# Patient Record
Sex: FEMALE | Race: WHITE | Employment: FULL TIME | URBAN - METROPOLITAN AREA
[De-identification: names, ages, dates, MRNs, and addresses within clinical notes are randomized per-mention and may not be internally consistent; named-entity substitution may affect disease eponyms.]

---

## 2019-08-01 ENCOUNTER — APPOINTMENT (OUTPATIENT)
Dept: GENERAL RADIOLOGY | Age: 39
End: 2019-08-01
Payer: COMMERCIAL

## 2019-08-01 ENCOUNTER — HOSPITAL ENCOUNTER (EMERGENCY)
Age: 39
Discharge: HOME | End: 2019-08-01
Attending: EMERGENCY MEDICINE
Payer: COMMERCIAL

## 2019-08-01 VITALS
OXYGEN SATURATION: 99 % | DIASTOLIC BLOOD PRESSURE: 83 MMHG | BODY MASS INDEX: 22.15 KG/M2 | RESPIRATION RATE: 14 BRPM | WEIGHT: 125 LBS | HEART RATE: 77 BPM | HEIGHT: 63 IN | SYSTOLIC BLOOD PRESSURE: 128 MMHG | TEMPERATURE: 98.2 F

## 2019-08-01 DIAGNOSIS — S62.347A CLOSED NONDISPLACED FRACTURE OF BASE OF FIFTH METACARPAL BONE OF LEFT HAND, INITIAL ENCOUNTER: ICD-10-CM

## 2019-08-01 DIAGNOSIS — S62.355A CLOSED NONDISPLACED FRACTURE OF SHAFT OF FOURTH METACARPAL BONE OF LEFT HAND, INITIAL ENCOUNTER: Primary | ICD-10-CM

## 2019-08-01 PROCEDURE — 73110 X-RAY EXAM OF WRIST: CPT

## 2019-08-01 PROCEDURE — 99283 EMERGENCY DEPT VISIT LOW MDM: CPT

## 2019-08-01 PROCEDURE — 73130 X-RAY EXAM OF HAND: CPT

## 2019-08-01 PROCEDURE — 6370000000 HC RX 637 (ALT 250 FOR IP): Performed by: NURSE PRACTITIONER

## 2019-08-01 RX ORDER — IBUPROFEN 400 MG/1
400 TABLET ORAL ONCE
Status: COMPLETED | OUTPATIENT
Start: 2019-08-01 | End: 2019-08-01

## 2019-08-01 RX ORDER — HYDROCODONE BITARTRATE AND ACETAMINOPHEN 5; 325 MG/1; MG/1
1 TABLET ORAL EVERY 8 HOURS PRN
Qty: 9 TABLET | Refills: 0 | Status: SHIPPED | OUTPATIENT
Start: 2019-08-01 | End: 2019-08-04

## 2019-08-01 RX ORDER — LIDOCAINE HYDROCHLORIDE 10 MG/ML
5 INJECTION, SOLUTION INFILTRATION; PERINEURAL ONCE
Status: DISCONTINUED | OUTPATIENT
Start: 2019-08-01 | End: 2019-08-02 | Stop reason: HOSPADM

## 2019-08-01 RX ORDER — BUPIVACAINE HYDROCHLORIDE 2.5 MG/ML
30 INJECTION, SOLUTION EPIDURAL; INFILTRATION; INTRACAUDAL ONCE
Status: DISCONTINUED | OUTPATIENT
Start: 2019-08-01 | End: 2019-08-02 | Stop reason: HOSPADM

## 2019-08-01 RX ADMIN — IBUPROFEN 400 MG: 400 TABLET ORAL at 19:40

## 2019-08-01 ASSESSMENT — PAIN DESCRIPTION - PAIN TYPE: TYPE: ACUTE PAIN

## 2019-08-01 ASSESSMENT — PAIN SCALES - GENERAL
PAINLEVEL_OUTOF10: 6
PAINLEVEL_OUTOF10: 6

## 2019-08-01 NOTE — ED PROVIDER NOTES
ED Attending  CC: Linda       Department of Emergency Medicine   ED  Provider Note  Admit Date/RoomTime: 8/1/2019  7:04 PM  ED Room: 35/  Chief Complaint   Wrist Injury (left wrist after fall during softball game)    History of Present Illness   Source of history provided by:  patient. History/Exam Limitations: none. Ashwin Bronson is a 41 y.o. old female who has a past medical history of: History reviewed. No pertinent past medical history. presents to the emergency department by private vehicle, for Left hand and wrist pain which occured 1 hour(s) prior to arrival.  Cause of complaint: landed on softball glove-covered hand while playing softball. There has been a history of no prior problems with this area in the past. Since onset the symptoms have been moderate in degree. Her pain is aggraveated by movement (extension of the 4-5th digits, use and palpation and relieved by rest.  No sensation change or loss. No additional pain complaints. ROS    Pertinent positives and negatives are stated within HPI, all other systems reviewed and are negative. Past Surgical History:  has a past surgical history that includes [place] tooth extraction and turbinate resection (08/17/2017). Social History:  reports that she has never smoked. She has never used smokeless tobacco. She reports that she drinks about 2.0 standard drinks of alcohol per week. She reports that she does not use drugs. Family History: family history is not on file. Allergies: Patient has no known allergies. Physical Exam            ED Triage Vitals [08/01/19 1902]   BP Temp Temp Source Pulse Resp SpO2 Height Weight   128/83 98.2 °F (36.8 °C) Oral 77 14 99 % 5' 3\" (1.6 m) 125 lb (56.7 kg)     Oxygen Saturation Interpretation: Normal.    Constitutional:  Alert, development consistent with age. Neck:  Normal ROM. Supple. Non-tender. Wrist:  Left  radial and ulnar aspect. Tenderness:  none. Swelling: None.

## 2020-09-10 ENCOUNTER — HOSPITAL ENCOUNTER (OUTPATIENT)
Age: 40
Discharge: HOME | End: 2020-09-12
Payer: COMMERCIAL

## 2020-09-10 ENCOUNTER — OFFICE VISIT (OUTPATIENT)
Dept: PRIMARY CARE CLINIC | Age: 40
End: 2020-09-10
Payer: COMMERCIAL

## 2020-09-10 VITALS
BODY MASS INDEX: 23.25 KG/M2 | OXYGEN SATURATION: 98 % | TEMPERATURE: 98.4 F | HEART RATE: 74 BPM | SYSTOLIC BLOOD PRESSURE: 118 MMHG | RESPIRATION RATE: 16 BRPM | WEIGHT: 131.2 LBS | DIASTOLIC BLOOD PRESSURE: 64 MMHG | HEIGHT: 63 IN

## 2020-09-10 LAB
ALBUMIN SERPL-MCNC: 4.4 G/DL (ref 3.5–5.2)
ALP BLD-CCNC: 37 U/L (ref 35–104)
ALT SERPL-CCNC: 16 U/L (ref 0–32)
ANION GAP SERPL CALCULATED.3IONS-SCNC: 16 MMOL/L (ref 7–16)
AST SERPL-CCNC: 19 U/L (ref 0–31)
BASOPHILS ABSOLUTE: 0.06 E9/L (ref 0–0.2)
BASOPHILS RELATIVE PERCENT: 0.7 % (ref 0–2)
BILIRUB SERPL-MCNC: 0.4 MG/DL (ref 0–1.2)
BUN BLDV-MCNC: 19 MG/DL (ref 6–20)
CALCIUM SERPL-MCNC: 9.9 MG/DL (ref 8.6–10.2)
CHLORIDE BLD-SCNC: 95 MMOL/L (ref 98–107)
CO2: 26 MMOL/L (ref 22–29)
CREAT SERPL-MCNC: 1 MG/DL (ref 0.5–1)
EOSINOPHILS ABSOLUTE: 0.04 E9/L (ref 0.05–0.5)
EOSINOPHILS RELATIVE PERCENT: 0.5 % (ref 0–6)
FOLATE: >20 NG/ML (ref 4.8–24.2)
GFR AFRICAN AMERICAN: >60
GFR NON-AFRICAN AMERICAN: >60 ML/MIN/1.73
GLUCOSE BLD-MCNC: 93 MG/DL (ref 74–99)
HBA1C MFR BLD: 4.9 % (ref 4–5.6)
HCG QUALITATIVE: NEGATIVE
HCT VFR BLD CALC: 42.2 % (ref 34–48)
HEMOGLOBIN: 13.6 G/DL (ref 11.5–15.5)
IMMATURE GRANULOCYTES #: 0.03 E9/L
IMMATURE GRANULOCYTES %: 0.4 % (ref 0–5)
LYMPHOCYTES ABSOLUTE: 2.52 E9/L (ref 1.5–4)
LYMPHOCYTES RELATIVE PERCENT: 30.4 % (ref 20–42)
MCH RBC QN AUTO: 30.5 PG (ref 26–35)
MCHC RBC AUTO-ENTMCNC: 32.2 % (ref 32–34.5)
MCV RBC AUTO: 94.6 FL (ref 80–99.9)
MONOCYTES ABSOLUTE: 0.62 E9/L (ref 0.1–0.95)
MONOCYTES RELATIVE PERCENT: 7.5 % (ref 2–12)
NEUTROPHILS ABSOLUTE: 5.03 E9/L (ref 1.8–7.3)
NEUTROPHILS RELATIVE PERCENT: 60.5 % (ref 43–80)
PDW BLD-RTO: 13 FL (ref 11.5–15)
PLATELET # BLD: 329 E9/L (ref 130–450)
PMV BLD AUTO: 11.4 FL (ref 7–12)
POTASSIUM SERPL-SCNC: 3.8 MMOL/L (ref 3.5–5)
RBC # BLD: 4.46 E12/L (ref 3.5–5.5)
RHEUMATOID FACTOR: 10 IU/ML (ref 0–13)
SEDIMENTATION RATE, ERYTHROCYTE: 2 MM/HR (ref 0–20)
SODIUM BLD-SCNC: 137 MMOL/L (ref 132–146)
TOTAL PROTEIN: 7.2 G/DL (ref 6.4–8.3)
TSH SERPL DL<=0.05 MIU/L-ACNC: 1.65 UIU/ML (ref 0.27–4.2)
VITAMIN B-12: 412 PG/ML (ref 211–946)
VITAMIN D 25-HYDROXY: 115 NG/ML (ref 30–100)
WBC # BLD: 8.3 E9/L (ref 4.5–11.5)

## 2020-09-10 PROCEDURE — 84703 CHORIONIC GONADOTROPIN ASSAY: CPT

## 2020-09-10 PROCEDURE — 85025 COMPLETE CBC W/AUTO DIFF WBC: CPT

## 2020-09-10 PROCEDURE — 99214 OFFICE O/P EST MOD 30 MIN: CPT | Performed by: FAMILY MEDICINE

## 2020-09-10 PROCEDURE — 83036 HEMOGLOBIN GLYCOSYLATED A1C: CPT

## 2020-09-10 PROCEDURE — 82746 ASSAY OF FOLIC ACID SERUM: CPT

## 2020-09-10 PROCEDURE — 82607 VITAMIN B-12: CPT

## 2020-09-10 PROCEDURE — 86038 ANTINUCLEAR ANTIBODIES: CPT

## 2020-09-10 PROCEDURE — 86431 RHEUMATOID FACTOR QUANT: CPT

## 2020-09-10 PROCEDURE — 85651 RBC SED RATE NONAUTOMATED: CPT

## 2020-09-10 PROCEDURE — 84443 ASSAY THYROID STIM HORMONE: CPT

## 2020-09-10 PROCEDURE — 82306 VITAMIN D 25 HYDROXY: CPT

## 2020-09-10 PROCEDURE — 80053 COMPREHEN METABOLIC PANEL: CPT

## 2020-09-10 PROCEDURE — 36415 COLL VENOUS BLD VENIPUNCTURE: CPT

## 2020-09-10 RX ORDER — METHYLPREDNISOLONE 4 MG/1
TABLET ORAL
COMMUNITY
Start: 2020-09-04 | End: 2020-11-16 | Stop reason: ALTCHOICE

## 2020-09-10 ASSESSMENT — ENCOUNTER SYMPTOMS
VOMITING: 0
SHORTNESS OF BREATH: 0
ALLERGIC/IMMUNOLOGIC NEGATIVE: 1
BLOOD IN STOOL: 0
ABDOMINAL PAIN: 0
PHOTOPHOBIA: 0
SORE THROAT: 0
CHEST TIGHTNESS: 0
DIARRHEA: 0
WHEEZING: 0
APNEA: 0
FACIAL SWELLING: 0
BACK PAIN: 0
NAUSEA: 0
COLOR CHANGE: 0
COUGH: 0
SINUS PRESSURE: 0

## 2020-09-10 ASSESSMENT — PATIENT HEALTH QUESTIONNAIRE - PHQ9
SUM OF ALL RESPONSES TO PHQ QUESTIONS 1-9: 0
SUM OF ALL RESPONSES TO PHQ9 QUESTIONS 1 & 2: 0
1. LITTLE INTEREST OR PLEASURE IN DOING THINGS: 0
2. FEELING DOWN, DEPRESSED OR HOPELESS: 0
SUM OF ALL RESPONSES TO PHQ QUESTIONS 1-9: 0

## 2020-09-10 NOTE — PROGRESS NOTES
Chief Complaint:   Chief Complaint   Patient presents with   [facility]       Neurologic Problem   The patient's pertinent negatives include no syncope or weakness. Primary symptoms comment: numbness. This is a chronic problem. There was right-sided focality noted. Pertinent negatives include no abdominal pain, back pain, chest pain, confusion, headaches, light-headedness, nausea, palpitations, shortness of breath or vomiting.   doing well no new issues    There is no problem list on file for this patient. History reviewed. No pertinent past medical history. Past Surgical History:   Procedure Laterality Date    TURBINATE RESECTION  08/17/2017    WISDOM TOOTH EXTRACTION         Current Outpatient Medications   Medication Sig Dispense Refill    methylPREDNISolone (MEDROL DOSEPACK) 4 MG tablet       norethindrone-ethinyl estradiol (BALZIVA) 0.4-35 MG-MCG per tablet Take by mouth See Admin Instructions      Multiple Vitamins-Minerals (THERAPEUTIC MULTIVITAMIN-MINERALS) tablet Take 1 tablet by mouth daily LD 8/14/17       No current facility-administered medications for this visit. No Known Allergies    Social History     Socioeconomic History    Marital status: Single     Spouse name: None    Number of children: None    Years of education: None    Highest education level: None   Occupational History    None   Social Needs    Financial resource strain: None    Food insecurity     Worry: None     Inability: None    Transportation needs     Medical: None     Non-medical: None   Tobacco Use    Smoking status: Never Smoker    Smokeless tobacco: Never Used   Substance and Sexual Activity    Alcohol use:  Yes     Alcohol/week: 2.0 standard drinks     Types: 2 Cans of beer per week     Comment: 2-3 beers 2-3 days a week     Drug use: Never    Sexual activity: None   Lifestyle    Physical activity     Days per week: None     Minutes per session: None    Stress: None   Relationships    Social connections     Talks on phone: None     Gets together: None     Attends Judaism service: None     Active member of club or organization: None     Attends meetings of clubs or organizations: None     Relationship status: None    Intimate partner violence     Fear of current or ex partner: None     Emotionally abused: None     Physically abused: None     Forced sexual activity: None   Other Topics Concern    None   Social History Narrative    None       History reviewed. No pertinent family history. Review of Systems   Constitutional: Negative. HENT: Negative for congestion, facial swelling, hearing loss, nosebleeds, sinus pressure and sore throat. Eyes: Negative for photophobia and visual disturbance. Respiratory: Negative for apnea, cough, chest tightness, shortness of breath and wheezing. Cardiovascular: Negative for chest pain, palpitations and leg swelling. Gastrointestinal: Negative for abdominal pain, blood in stool, diarrhea, nausea and vomiting. Genitourinary: Negative for difficulty urinating, frequency and urgency. Musculoskeletal: Positive for arthralgias. Negative for back pain, joint swelling and myalgias. Skin: Negative for color change and rash. Allergic/Immunologic: Negative. Neurological: Positive for numbness. Negative for syncope, weakness, light-headedness and headaches. Hematological: Negative. Psychiatric/Behavioral: Negative for agitation, behavioral problems, confusion and self-injury. The patient is not nervous/anxious. All other systems reviewed and are negative. Physical Exam  Vitals signs and nursing note reviewed. Constitutional:       General: She is not in acute distress. Appearance: She is well-developed. HENT:      Head: Normocephalic and atraumatic. Nose: Nose normal.   Eyes:      Conjunctiva/sclera: Conjunctivae normal.      Pupils: Pupils are equal, round, and reactive to light.    Neck:      Musculoskeletal: Normal range of motion and neck supple. Thyroid: No thyromegaly. Vascular: No JVD. Cardiovascular:      Rate and Rhythm: Normal rate and regular rhythm. Heart sounds: Normal heart sounds. No murmur. No friction rub. No gallop. Pulmonary:      Effort: Pulmonary effort is normal. No respiratory distress. Breath sounds: Normal breath sounds. No wheezing. Abdominal:      General: Bowel sounds are normal. There is no distension. Palpations: Abdomen is soft. Tenderness: There is no abdominal tenderness. There is no guarding or rebound. Musculoskeletal: Normal range of motion. Lymphadenopathy:      Cervical: No cervical adenopathy. Skin:     General: Skin is warm and dry. Findings: No erythema or rash. Neurological:      Mental Status: She is alert and oriented to person, place, and time. Cranial Nerves: No cranial nerve deficit. Motor: No abnormal muscle tone. Coordination: Coordination normal.      Deep Tendon Reflexes: Reflexes are normal and symmetric. Psychiatric:         Behavior: Behavior normal.         Judgment: Judgment normal.                               ASSESSMENT/PLAN:    Jai Sanford was seen today for establish care. Diagnoses and all orders for this visit:    Arthralgia, unspecified joint  -     CBC Auto Differential; Future  -     Comprehensive Metabolic Panel; Future  -     TSH without Reflex; Future  -     Hemoglobin A1C; Future  -     VITAMIN B12 & FOLATE; Future  -     Vitamin D 25 Hydroxy; Future  -     SEDIMENTATION RATE; Future  -     RHEUMATOID FACTOR; Future  -     TOOTIE; Future  -     HCG Qualitative, Serum; Future    Neuropathy  -     CBC Auto Differential; Future  -     Comprehensive Metabolic Panel; Future  -     TSH without Reflex; Future  -     Hemoglobin A1C; Future  -     VITAMIN B12 & FOLATE; Future  -     Vitamin D 25 Hydroxy; Future  -     SEDIMENTATION RATE; Future  -     RHEUMATOID FACTOR; Future  -     TOOTIE;  Future  -     HCG Qualitative, Serum;  Future  -     Dina Zaidi MD, Neurology, [place], [place]    9/10/2020  11:29 AM

## 2020-09-11 ENCOUNTER — TELEPHONE (OUTPATIENT)
Dept: PRIMARY CARE CLINIC | Age: 40
End: 2020-09-11

## 2020-09-11 LAB — ANTI-NUCLEAR ANTIBODY (ANA): NEGATIVE

## 2020-09-11 NOTE — TELEPHONE ENCOUNTER
Pt says the only kind of vitamin she takes is a multi vitamin, she does not take a vitamin d supplement

## 2020-09-14 ENCOUNTER — NURSE ONLY (OUTPATIENT)
Dept: PRIMARY CARE CLINIC | Age: 40
End: 2020-09-14
Payer: COMMERCIAL

## 2020-09-14 PROCEDURE — 96372 THER/PROPH/DIAG INJ SC/IM: CPT | Performed by: FAMILY MEDICINE

## 2020-09-14 RX ORDER — CYANOCOBALAMIN 1000 UG/ML
1000 INJECTION INTRAMUSCULAR; SUBCUTANEOUS ONCE
Status: COMPLETED | OUTPATIENT
Start: 2020-09-14 | End: 2020-09-14

## 2020-09-14 RX ADMIN — CYANOCOBALAMIN 1000 MCG: 1000 INJECTION INTRAMUSCULAR; SUBCUTANEOUS at 09:53

## 2020-10-06 ENCOUNTER — TELEPHONE (OUTPATIENT)
Dept: PRIMARY CARE CLINIC | Age: 40
End: 2020-10-06

## 2020-10-06 ENCOUNTER — OFFICE VISIT (OUTPATIENT)
Dept: NEUROLOGY | Age: 40
End: 2020-10-06
Payer: COMMERCIAL

## 2020-10-06 ENCOUNTER — HOSPITAL ENCOUNTER (OUTPATIENT)
Age: 40
Discharge: HOME | End: 2020-10-08
Payer: COMMERCIAL

## 2020-10-06 VITALS
WEIGHT: 131 LBS | TEMPERATURE: 98 F | BODY MASS INDEX: 23.21 KG/M2 | DIASTOLIC BLOOD PRESSURE: 80 MMHG | HEIGHT: 63 IN | SYSTOLIC BLOOD PRESSURE: 120 MMHG

## 2020-10-06 PROBLEM — G62.9 NEUROPATHY: Status: ACTIVE | Noted: 2020-10-06

## 2020-10-06 PROBLEM — Z86.39 HX OF NON ANEMIC VITAMIN B12 DEFICIENCY: Status: ACTIVE | Noted: 2020-10-06

## 2020-10-06 LAB
MAGNESIUM: 2.1 MG/DL (ref 1.6–2.6)
TOTAL CK: 109 U/L (ref 20–180)

## 2020-10-06 PROCEDURE — 82164 ANGIOTENSIN I ENZYME TEST: CPT

## 2020-10-06 PROCEDURE — 84207 ASSAY OF VITAMIN B-6: CPT

## 2020-10-06 PROCEDURE — 86235 NUCLEAR ANTIGEN ANTIBODY: CPT

## 2020-10-06 PROCEDURE — 82525 ASSAY OF COPPER: CPT

## 2020-10-06 PROCEDURE — 86592 SYPHILIS TEST NON-TREP QUAL: CPT

## 2020-10-06 PROCEDURE — 84630 ASSAY OF ZINC: CPT

## 2020-10-06 PROCEDURE — 82085 ASSAY OF ALDOLASE: CPT

## 2020-10-06 PROCEDURE — 84446 ASSAY OF VITAMIN E: CPT

## 2020-10-06 PROCEDURE — 82550 ASSAY OF CK (CPK): CPT

## 2020-10-06 PROCEDURE — 86334 IMMUNOFIX E-PHORESIS SERUM: CPT

## 2020-10-06 PROCEDURE — 86803 HEPATITIS C AB TEST: CPT

## 2020-10-06 PROCEDURE — 86255 FLUORESCENT ANTIBODY SCREEN: CPT

## 2020-10-06 PROCEDURE — 86618 LYME DISEASE ANTIBODY: CPT

## 2020-10-06 PROCEDURE — 99204 OFFICE O/P NEW MOD 45 MIN: CPT | Performed by: PSYCHIATRY & NEUROLOGY

## 2020-10-06 PROCEDURE — 83735 ASSAY OF MAGNESIUM: CPT

## 2020-10-06 PROCEDURE — 84425 ASSAY OF VITAMIN B-1: CPT

## 2020-10-06 PROCEDURE — 84165 PROTEIN E-PHORESIS SERUM: CPT

## 2020-10-06 PROCEDURE — 36415 COLL VENOUS BLD VENIPUNCTURE: CPT

## 2020-10-06 ASSESSMENT — ENCOUNTER SYMPTOMS
EYES NEGATIVE: 1
GASTROINTESTINAL NEGATIVE: 1
RESPIRATORY NEGATIVE: 1
ALLERGIC/IMMUNOLOGIC NEGATIVE: 1

## 2020-10-06 NOTE — PROGRESS NOTES
and folic acid levels, normal CBC, vitamin D level, TSH, blood glucose 93, hemoglobin A1c 4.9, normal LFTs, normal CMP. Imaging Data: X-ray left hand, 8/1/2019. Improved alignment compared to prior at site of fourth and fifth metacarpal bone fracture. Current Outpatient Medications   Medication Sig Dispense Refill    norethindrone-ethinyl estradiol (BALZIVA) 0.4-35 MG-MCG per tablet Take by mouth See Admin Instructions      Multiple Vitamins-Minerals (THERAPEUTIC MULTIVITAMIN-MINERALS) tablet Take 1 tablet by mouth daily LD 8/14/17      methylPREDNISolone (MEDROL DOSEPACK) 4 MG tablet        No current facility-administered medications for this visit. No Known Allergies    Patient Active Problem List   Diagnosis    Neuropathy    Hx of non anemic vitamin B12 deficiency       Past Medical History:   Diagnosis Date    Hx of non anemic vitamin B12 deficiency 10/6/2020    Neuropathy 10/6/2020       Past Surgical History:   Procedure Laterality Date    TURBINATE RESECTION  08/17/2017    WISDOM TOOTH EXTRACTION         No family history on file. No history of hereditary neuropathy. Social History     Socioeconomic History    Marital status: Single     Spouse name: Not on file    Number of children: Not on file    Years of education: Not on file    Highest education level: Not on file   Occupational History    Not on file   Social Needs    Financial resource strain: Not on file    Food insecurity     Worry: Not on file     Inability: Not on file    Transportation needs     Medical: Not on file     Non-medical: Not on file   Tobacco Use    Smoking status: Never Smoker    Smokeless tobacco: Never Used   Substance and Sexual Activity    Alcohol use:  Yes     Alcohol/week: 2.0 standard drinks     Types: 2 Cans of beer per week     Comment: 2-3 beers 2-3 days a week     Drug use: Never    Sexual activity: Not on file   Lifestyle    Physical activity     Days per week: Not on file Minutes per session: Not on file    Stress: Not on file   Relationships    Social connections     Talks on phone: Not on file     Gets together: Not on file     Attends Druze service: Not on file     Active member of club or organization: Not on file     Attends meetings of clubs or organizations: Not on file     Relationship status: Not on file    Intimate partner violence     Fear of current or ex partner: Not on file     Emotionally abused: Not on file     Physically abused: Not on file     Forced sexual activity: Not on file   Other Topics Concern    Not on file   Social History Narrative    Not on file     Review of Systems   Constitutional: Negative. HENT: Negative. Eyes: Negative. Respiratory: Negative. Cardiovascular: Negative. Gastrointestinal: Negative. Endocrine: Negative. Genitourinary: Negative. Musculoskeletal: Positive for arthralgias. Skin: Negative. Allergic/Immunologic: Negative. Neurological: Positive for numbness. Paresthesias   Hematological: Negative. Psychiatric/Behavioral: The patient is nervous/anxious. Neurologic Exam:  /80 (Site: Right Upper Arm, Position: Sitting, Cuff Size: Medium Adult)   Temp 98 °F (36.7 °C)   Ht 5' 3\" (1.6 m)   Wt 131 lb (59.4 kg)   BMI 23.21 kg/m²   General appearance: Alert, cooperative, anxious, with nourished, groomed, seated on the exam table, no acute distress. HEENT: Normocephalic/atraumatic. Neck: Supple  Cardiac: RRR  Respiratory: grossly clear  Extremities: No edema, erythema or cyanosis  Skin: No apparent lesions or rashes  Musculoskeletal: Negative bilateral straight leg raising test, no fasciculations, tremors or foot drop, no truncal sensory level. Mental Status: Alert, oriented x4  Speech/Language: Clear, grossly fluent  Attention span/Concentration: Grossly intact.   Affect/Mood: Anxious  Insight/Judgement: Appears fairly good     Fund of Knowledge/Current events: Grossly intact  CN II-XII:     Pupils: Equal, reactive to light, 3.0 mm     EOM's: Full without nystagmus  Visual Fields: Full to confrontation  Fundi: Miosis to light, unable to well visualize  CN V: normal V1-V3  CN VII: No facial droop  CN VIII: Hearing grossly intact  CN IX-XII: Tongue midline  SCM/Trapezii: 5/5 power  Motor: 5/5 power in the upper and lower extremities without tremor or drift and normal motor tone without cogwheeling or spasticity, intact fine motor function of both hands, symmetric. DTR's: 1+ and symmetric in the upper and lower extremities with reinforcement, no ankle clonus, plantar responses are flexor. Negative Tien's and finger flexion reflex responses. Negative Tinel's test to percussion over both wrists. Sensory: Reports grossly intact subjective sensation to light touch and sharp stick testing throughout. Denies a stocking/glove distribution of sensory loss pattern. No right/left confusion. No truncal sensory level. Coordination/Gait: Normal finger-to-nose and heel-to-shin testing, no truncal or cerebellar gait ataxia. Assessment/Plan:  1. History of paresthesias and neuropathic pain primarily affecting the right extremities reported as worsening over past several years, however, initial onset about 10 years ago when she was first diagnosed with a vitamin B12 deficiency while residing in [place], treated with monthly vitamin B12 injections with clinical improvement of her symptoms. 2.  Normal, nondiagnostic NCS/EMG study sampling the right extremities for peripheral neuropathy evaluation, April, 2019, [facility]. 3.  A small fiber neuropathy cannot be diagnosed by means of electrodiagnostic testing. 4.  Additional lab tests ordered to complete a neuropathy lab screening panel are listed below and once resulted she will be informed of the results by phone. 5.   An NCS/EMG study again sampling the right extremities for peripheral neuropathy evaluation has been ordered and will be completed within the next several weeks. She will receive the test results at the time of her study. 6.  She was provided patient information from the Neuropathy Association website. We discussed that up to 30% of neuropathy cases the exact etiology cannot be determined, thus idiopathic. Sincerely,      Felecia Lamas MD    This note was created using speech recognition transcription software. Despite proofreading, there may be several typographical errors present that may affect the meaning of the content. Please call with any questions. Note: More than 50% of this 40-minute face-face visit time included counseling and coordination of care based on clinical impression, review of test results, treatment plan, risk factor reduction and patient and/or family education.     Orders Placed This Encounter   Procedures    Vitamin B6     Standing Status:   Future     Standing Expiration Date:   10/6/2021    Vitamin B1     Standing Status:   Future     Standing Expiration Date:   10/6/2021    Sjogren's Ab (SS-A, SS-B)     Standing Status:   Future     Standing Expiration Date:   10/6/2021    RPR Reflex to Titer and TPPA     Standing Status:   Future     Standing Expiration Date:   10/6/2021    CK     Standing Status:   Future     Standing Expiration Date:   10/6/2021    Aldolase     Standing Status:   Future     Standing Expiration Date:   10/6/2021    Magnesium     Standing Status:   Future     Standing Expiration Date:   10/6/2021    Lyme Disease Acute Reflexive Panel     Standing Status:   Future     Standing Expiration Date:   10/6/2021    Immunofixation electrophoresis     Standing Status:   Future     Standing Expiration Date:   10/6/2021    Copper, Serum     Standing Status:   Future     Standing Expiration Date:   10/6/2021    ZINC     Standing Status:   Future     Standing Expiration Date:   10/6/2021    HEPATITIS C ANTIBODY     Standing Status:   Future     Standing Expiration Date:   10/6/2021    Angiotensin Converting Enzyme     Standing Status:   Future     Standing Expiration Date:   10/6/2021    Anti-Neutrophilic Cytoplasmic Antibody     Standing Status:   Future     Standing Expiration Date:   10/6/2021    VITAMIN E     Standing Status:   Future     Standing Expiration Date:   10/6/2021    EMG     Paresthesias, pain of rt. Limbs>left x several yrs. Order Specific Question:   Which body part?      Answer:   neuropathy eval., RUE/RLE

## 2020-10-07 LAB
ALBUMIN SERPL-MCNC: 3.6 G/DL (ref 3.5–4.7)
ALPHA-1-GLOBULIN: 0.3 G/DL (ref 0.2–0.4)
ALPHA-2-GLOBULIN: 0.7 G/FL (ref 0.5–1)
BETA GLOBULIN: 1 G/DL (ref 0.8–1.3)
ELECTROPHORESIS: NORMAL
GAMMA GLOBULIN: 1.1 G/DL (ref 0.7–1.6)
HEPATITIS C ANTIBODY INTERPRETATION: NORMAL
IMMUNOFIXATION RESULT, SERUM: NORMAL
RPR: NORMAL
TOTAL PROTEIN: 6.7 G/DL (ref 6.4–8.3)

## 2020-10-09 ENCOUNTER — OFFICE VISIT (OUTPATIENT)
Dept: NEUROLOGY | Age: 40
End: 2020-10-09
Payer: COMMERCIAL

## 2020-10-09 VITALS
BODY MASS INDEX: 23.04 KG/M2 | TEMPERATURE: 97.9 F | HEIGHT: 63 IN | SYSTOLIC BLOOD PRESSURE: 100 MMHG | DIASTOLIC BLOOD PRESSURE: 80 MMHG | WEIGHT: 130 LBS

## 2020-10-09 PROBLEM — M54.17 RIGHT LUMBOSACRAL RADICULOPATHY: Chronic | Status: ACTIVE | Noted: 2020-10-09

## 2020-10-09 LAB
ALDOLASE: 2.6 U/L (ref 1.5–8.1)
ANGIOTENSIN CONVERTING ENZYME: 8 U/L (ref 9–67)
ENA TO SSA (RO) ANTIBODY: NEGATIVE
ENA TO SSB (LA) ANTIBODY: NEGATIVE

## 2020-10-09 PROCEDURE — 99212 OFFICE O/P EST SF 10 MIN: CPT | Performed by: PSYCHIATRY & NEUROLOGY

## 2020-10-09 PROCEDURE — 95886 MUSC TEST DONE W/N TEST COMP: CPT | Performed by: PSYCHIATRY & NEUROLOGY

## 2020-10-09 PROCEDURE — 95912 NRV CNDJ TEST 11-12 STUDIES: CPT | Performed by: PSYCHIATRY & NEUROLOGY

## 2020-10-09 NOTE — PROGRESS NOTES
[id number] [facility]  Electrodiagnostic Laboratory  *Accredited by the [facility] with exemplary status  [address] [facility]  Phone: [phone]  Fax: [phone]    Referring Provider: Kim Arrington MD  Primary Care Physician: Gisselle Lee DO  Patient Name: Joanne Mitchell  Patient YOB: 1980  Gender: female  BMI: Body mass index is 23.03 kg/m². Blood pressure 100/80, temperature 97.9 °F (36.6 °C), height 5' 3\" (1.6 m), weight 130 lb (59 kg). 10/9/2020    Description of clinical problem: Evaluation for peripheral neuropathy, reports sensation of numbness tingling and aching pain involving the right upper and lower extremities primarily and somewhat in the left upper extremity times several years. Past diagnosis of vitamin B12 deficiency starting about 10 years ago. Chief Complaint   Patient presents with    Procedure     EMG     Pain Yes   ; Numbness/tingling  Yes; Weakness  No       Brief physical exam:   Sensory deficit No; Weakness No; Atrophy  No; Reflex abnormality No  (Please refer to prior recent Neurology consult note of 10/6/2020 for additional information if needed.)  Focused neurologic exam shows grossly intact 5/5 power in the upper and lower extremities without tremor or drift and normal motor tone. DTRs: 1+ and symmetric, no ankle clonus. Negative Tien's and finger flexion reflex responses, negative Tinel's test to percussion over both wrists. No focal muscular atrophy or fasciculations. Sensory exam shows grossly intact subjective sensation to light touch and sharp stick testing throughout, no stocking/glove distribution of sensory loss pattern. Musculoskeletal: Negative bilateral straight leg raising test.    Study Limitations:  None.       Full Name: Laurie Martinez Gender: Female  MRN: [id number] YOB: 1980      Visit Date: 10/9/2020 08:39  Age: 47 Years 1 Months Old  Examining Physician: Dr. Nba Clifton   Referring Physician: Dr. Nba Clifton Technician: Emily Villasenor   Height: 5 feet 3 inch  Weight: 130 lbs  Notes: Neuropathy \ Paresthesia       Motor NCS      Nerve / Sites Latency Amplitude Amp. 1-2 Distance Lat Diff Velocity Temp.    ms mV % cm ms m/s °C   R Median - APB      Wrist 3.44 10.7 100 8   32      Elbow 6.88 10.5 98.4 20 3.44 58 32   R Ulnar - ADM      Wrist 2.81 10.5 100 8   32.5      B. Elbow 5.89 10.9 104 19 3.07 62 32.5      A. Elbow 7.60 9.0 85.8 10 1.72 58 32.5   R Peroneal - EDB      Ankle 4.11 13.3 100 8   32.6      Pop fossa 11.82 12.4 93.7 35 7.71 45 32.6   R Tibial - AH      Ankle 4.17 10.6 100 8   32.7      Pop fossa 12.03 9.4 88.1 37 7.86 47 32.7               Sensory NCS      Nerve / Sites Onset Lat Peak Lat PP Amp Amp. 1-2 Distance Velocity Temp.    ms ms µV % cm m/s °C   R Median - Digit II (Antidromic)      Mid Palm 1.25 2.14 113.4 100 7 56 32.6      Wrist 2.71 3.59 95.3 81.8 14 52 32.6   R Ulnar - Digit V (Antidromic)      Wrist 2.81 3.70 71.8 100 14 50 32.5   R Radial - Anatomical  (Forearm)      Forearm 1.72 2.34 47.0 100 10 58 32.5   R Sural - Ankle (Calf)      Calf 3.07 3.96 7.9 100 14 46 32.2   R Superficial peroneal - Ankle      Lat leg 2.40 2.97 5.1 100 10 42 32.7                 Combined Sensory Index      Nerve / Sites Rec. Site Peak Lat NP Amp PP Amp Segments Peak Diff Temp.      ms µV µV  ms °C   R Median - CSI      Median Thumb 2.92 23.0 40.3 Median - Radial 0.16 32.5      Radial Thumb 2.76 13.1 18.1 Median - Ulnar -0.36 32.5      Median Ring 3.54 42.8 65.6 Median palm - Ulnar palm -0.05 32.3      Ulnar Ring 3.91 36.4 57.4         Median palm Wrist 1.88 196.5 215.6         Ulnar palm Wrist 1.93 20.9 46.1         CSI     CSI 0.26          F  Wave      Nerve F Lat M Lat F-M Lat    ms ms ms   R Peroneal-EDB 49.2 4.3 44.9   R Tibial- AH 50.5 3.7 46.8   R Median-APB 25.7 3.4 22.2   R Ulnar  ADM 25.0 2.2 22.8       H Reflex      Nerve Lat Hmax    ms   R Tibial - [place]us 27.6   L Tibial - Soleus 28.6       EMG         EMG Summary Table     Spontaneous MUAP Recruitment   Muscle IA Fib PSW Fasc H.F. Amp Dur. PPP Pattern   R. Tibialis anterior Normal None None None None Normal Normal None Normal   R. Gastroc (Medial head) Normal None None None None 1+ 1+ None Decr   R. Flexor digitorum longus Normal None None None None 2+ 2+ None Decr   R. Extensor digitorum brevis Normal None None None None Giant 2+ None Decr   R. Abductor hallucis Normal None None None None 1+ 1+ None Decr   R. Vastus lateralis Normal None None None None 1+ 1+ None Decr   R. Gluteus medius Normal None None None None Normal Normal None Normal   R. Upper Sacral paraspinals Incr 1+ None None None -- -- -- --   R. Lumbar paraspinals (low) Normal None None None None -- -- -- --   R. First dorsal interosseous Normal None None None None Normal Normal None Normal   R. Abductor pollicis brevis Normal None None None None Normal Normal None Normal   R. Flexor pollicis longus Normal None None None None Normal Normal None Normal   R. Ext digit comm Normal None None None None Normal Normal None Normal   R. Pronator teres Normal None None None None Normal Normal None Normal   R. Biceps brachii Normal None None None None Normal Normal None Normal   R. Triceps brachii Normal None None None None Normal Normal None Normal   R. Deltoid Normal None None None None Normal Normal None Normal       Summary of Findings:   Nerve conduction studies:   · All nerve conduction studies listed in the table above were normal in latency, amplitude and conduction velocity. Needle EMG:   · Needle EMG was performed using a concentric needle. · The following abnormalities were seen on needle EMG: Enlarged motor unit potentials in duration and amplitude with decreased recruitment (loss of motor units) of a mild to moderate degree in primarily a right lumbosacral (I.e., L5/S1) myotomal distribution as listed.   · The right upper sacral paraspinal muscle group shows increased insertional activity and 1+

## 2020-10-10 LAB
ANCA IFA: NORMAL
LYME, EIA: 0.32 LIV (ref 0–1.2)

## 2020-10-11 LAB
ALPHA-TOCOPHEROL: 11.3 MG/L (ref 5.5–18)
COPPER: 147.9 UG/DL (ref 80–155)
GAMMA-TOCOPHEROL: 0.4 MG/L (ref 0–6)
VITAMIN B1 WHOLE BLOOD: 142 NMOL/L (ref 70–180)
ZINC: 89.1 UG/DL (ref 60–120)

## 2020-10-13 ENCOUNTER — TELEPHONE (OUTPATIENT)
Dept: NEUROLOGY | Age: 40
End: 2020-10-13

## 2020-10-13 NOTE — TELEPHONE ENCOUNTER
----- Message from Oxana Kaplan MD sent at 10/12/2020  6:06 PM EDT -----  Notify X-ray lumbar spine shows no significant findings.

## 2020-10-14 LAB — VITAMIN B6: 96.3 NMOL/L (ref 20–125)

## 2020-10-15 ENCOUNTER — NURSE ONLY (OUTPATIENT)
Dept: PRIMARY CARE CLINIC | Age: 40
End: 2020-10-15
Payer: COMMERCIAL

## 2020-10-15 PROCEDURE — 96372 THER/PROPH/DIAG INJ SC/IM: CPT | Performed by: FAMILY MEDICINE

## 2020-10-15 RX ORDER — CYANOCOBALAMIN 1000 UG/ML
1000 INJECTION INTRAMUSCULAR; SUBCUTANEOUS ONCE
Status: COMPLETED | OUTPATIENT
Start: 2020-10-15 | End: 2020-10-15

## 2020-10-15 RX ADMIN — CYANOCOBALAMIN 1000 MCG: 1000 INJECTION INTRAMUSCULAR; SUBCUTANEOUS at 09:20

## 2020-11-16 ENCOUNTER — OFFICE VISIT (OUTPATIENT)
Dept: NEUROLOGY | Age: 40
End: 2020-11-16
Payer: COMMERCIAL

## 2020-11-16 VITALS
WEIGHT: 130 LBS | TEMPERATURE: 97.4 F | BODY MASS INDEX: 23.04 KG/M2 | RESPIRATION RATE: 16 BRPM | OXYGEN SATURATION: 100 % | HEIGHT: 63 IN | DIASTOLIC BLOOD PRESSURE: 77 MMHG | SYSTOLIC BLOOD PRESSURE: 114 MMHG | HEART RATE: 64 BPM

## 2020-11-16 PROCEDURE — 99244 OFF/OP CNSLTJ NEW/EST MOD 40: CPT | Performed by: PSYCHIATRY & NEUROLOGY

## 2020-11-16 RX ORDER — GABAPENTIN 300 MG/1
300 CAPSULE ORAL 2 TIMES DAILY
Qty: 60 CAPSULE | Refills: 0 | Status: SHIPPED
Start: 2020-11-16 | End: 2020-12-10 | Stop reason: SDUPTHER

## 2020-11-16 ASSESSMENT — ENCOUNTER SYMPTOMS
NAUSEA: 0
SHORTNESS OF BREATH: 0
PHOTOPHOBIA: 0
VOMITING: 0
TROUBLE SWALLOWING: 0

## 2020-11-16 NOTE — PROGRESS NOTES
sensorimotor peripheral polyneuropathy.     A small fiber neuropathy cannot be diagnosed by means of electrodiagnostic testing.     Previous Study: None known.     A lumbar spine x-ray has been ordered to evaluate for DJD, spondylosis. Labs: SPEP, serum immunofixation, B1, B6, SSA, SSB, RPR, CK, aldolase, Lyme, zinc, copper, hepatitis C, ACE, ANCA, vitamin E, hemoglobin R6T, O39, folic acid, vitamin D, ESR, rheumatoid factor, TOOTIE: Normal      I have personally reviewed her lab results and medical records. I have personally reviewed her EMG numbers. PAST MEDICAL HISTORY:   Past Medical History:   Diagnosis Date    Hx of non anemic vitamin B12 deficiency 10/6/2020    Neuropathy 10/6/2020    Right lumbosacral radiculopathy 10/9/2020     PAST SURGICAL HISTORY:   Past Surgical History:   Procedure Laterality Date    TURBINATE RESECTION  08/17/2017    WISDOM TOOTH EXTRACTION       FAMILY MEDICAL HISTORY:   Family History   Problem Relation Age of Onset    Other Mother         ALS      SOCIAL HISTORY:   Social History     Socioeconomic History    Marital status: Single     Spouse name: None    Number of children: None    Years of education: None    Highest education level: None   Occupational History    None   Social Needs    Financial resource strain: None    Food insecurity     Worry: None     Inability: None    Transportation needs     Medical: None     Non-medical: None   Tobacco Use    Smoking status: Never Smoker    Smokeless tobacco: Never Used   Substance and Sexual Activity    Alcohol use:  Yes     Alcohol/week: 2.0 standard drinks     Types: 2 Cans of beer per week     Comment: 2-3 beers 2-3 days a week     Drug use: Never    Sexual activity: None   Lifestyle    Physical activity     Days per week: None     Minutes per session: None    Stress: None   Relationships    Social connections     Talks on phone: None     Gets together: None     Attends [employer] service: None     Active member of club or organization: None     Attends meetings of clubs or organizations: None     Relationship status: None    Intimate partner violence     Fear of current or ex partner: None     Emotionally abused: None     Physically abused: None     Forced sexual activity: None   Other Topics Concern    None   Social History Narrative    None      E-Cigarettes Vaping or Juuling     Questions Responses    Vaping Use Never User    Start Date     Does device contain nicotine? Quit Date     Vaping Type          Allergy: No Known Allergies  MEDS:   Current Outpatient Medications:     gabapentin (NEURONTIN) 300 MG capsule, Take 1 capsule by mouth 2 times daily for 30 days. Intended supply: 90 days, Disp: 60 capsule, Rfl: 0    norethindrone-ethinyl estradiol (BALZIVA) 0.4-35 MG-MCG per tablet, Take by mouth See Admin Instructions, Disp: , Rfl:     Multiple Vitamins-Minerals (THERAPEUTIC MULTIVITAMIN-MINERALS) tablet, Take 1 tablet by mouth daily LD 8/14/17, Disp: , Rfl:     REVIEW OF SYSTEMS  Review of Systems   Constitutional: Negative for appetite change, fatigue and unexpected weight change. HENT: Negative for drooling, hearing loss, tinnitus and trouble swallowing. Eyes: Negative for photophobia and visual disturbance. Respiratory: Negative for shortness of breath. Cardiovascular: Negative for palpitations. Gastrointestinal: Negative for nausea and vomiting. Endocrine: Negative for polyuria. Genitourinary: Negative for flank pain. Musculoskeletal: Negative for neck pain and neck stiffness. Skin: Negative for rash. Allergic/Immunologic: Negative for food allergies. Neurological: Negative for dizziness, tremors, seizures, syncope, speech difficulty, weakness, light-headedness, numbness and headaches. Hematological: Negative for adenopathy. Psychiatric/Behavioral: Negative for agitation, behavioral problems and sleep disturbance.          PHYSICAL EXAM:   /77   Pulse 64   Temp 97.4 °F (36.3 °C) (Temporal)   Resp 16   Ht 5' 3\" (1.6 m)   Wt 130 lb (59 kg)   SpO2 100%   BMI 23.03 kg/m²   GENERAL APPEARANCE: Alert, well-developed, well-nourished female in no acute distress. HEENT: Normocephalic and atraumatic. PERRL. Oropharynx unremarkable. PULM: Normal respiratory effort. No accessory muscle use. CV: RRR. ABDOMEN: Soft, nontender. EXTREMITIES: No obvious signs of vascular compromise. Pulses present. No cyanosis, clubbing or edema. SKIN: Clear; no rashes, lesions or skin breaks in exposed areas. NEURO:     Neurological examination     MENTAL STATUS: Patient awake and oriented to time, place, and person. Recent/remote memory normal. Attention span/concentration normal. Speech fluent. Good comprehension, naming, and repetition. Fund of knowledge appropriate for patient's level of education. Affect normal.    CRANIAL NERVES:  CN I: Not tested. CN II: Fundoscopic exam not performed. CN III, IV, VI: Pupils equal, round and reactive to light; extra ocular movements full and intact. CN V: Facial sensation normal.  CN VII: No facial asymmetry. CN VIII:  Hearing grossly normal bilaterally. No pathologic nystagmus or skew deviation. CN IX, X: Palate elevates symmetrically. CN XI: Shoulder shrug and chin rotation equal with intact strength. CN XII: Tongue protrusion midline. MOTOR: Normal bulk. Tone normal and symmetric throughout. Strength 5/5 throughout. ABNORMAL MOVEMENTS/TREMORS: No     REFLEXES: DTRs 2+; normal and symmetric throughout. Plantar response downgoing. SENSATION: Sensation grossly intact to fine touch, pain/temperature, vibration and position. COORDINATION: Finger-to-nose and heel to shin normal for age and symmetric. Finger tapping and alternating movements normal.    STATION: Negative Romberg. GAIT:  Normal heel, toe and tandem; no ataxia.      DIAGNOSTIC TESTS:     I have personally reviewed the most recent lab results:    Sodium   Date Value Ref Range Status   09/10/2020 137 132 - 146 mmol/L Final     Potassium   Date Value Ref Range Status   09/10/2020 3.8 3.5 - 5.0 mmol/L Final     Chloride   Date Value Ref Range Status   09/10/2020 95 (L) 98 - 107 mmol/L Final     CO2   Date Value Ref Range Status   09/10/2020 26 22 - 29 mmol/L Final     BUN   Date Value Ref Range Status   09/10/2020 19 6 - 20 mg/dL Final     CREATININE   Date Value Ref Range Status   09/10/2020 1.0 0.5 - 1.0 mg/dL Final     GFR Non-   Date Value Ref Range Status   09/10/2020 >60 >=60 mL/min/1.73 Final     Comment:     Chronic Kidney Disease: less than 60 ml/min/1.73 sq.m. Kidney Failure: less than 15 ml/min/1.73 sq.m. Results valid for patients 18 years and older.        Calcium   Date Value Ref Range Status   09/10/2020 9.9 8.6 - 10.2 mg/dL Final     Magnesium   Date Value Ref Range Status   10/06/2020 2.1 1.6 - 2.6 mg/dL Final     WBC   Date Value Ref Range Status   09/10/2020 8.3 4.5 - 11.5 E9/L Final     Hemoglobin   Date Value Ref Range Status   09/10/2020 13.6 11.5 - 15.5 g/dL Final     Hematocrit   Date Value Ref Range Status   09/10/2020 42.2 34.0 - 48.0 % Final     Platelets   Date Value Ref Range Status   09/10/2020 329 130 - 450 E9/L Final     Neutrophils %   Date Value Ref Range Status   09/10/2020 60.5 43.0 - 80.0 % Final     Monocytes %   Date Value Ref Range Status   09/10/2020 7.5 2.0 - 12.0 % Final     Total Protein   Date Value Ref Range Status   10/06/2020 6.7 6.4 - 8.3 g/dL Final   09/10/2020 7.2 6.4 - 8.3 g/dL Final     Total Bilirubin   Date Value Ref Range Status   09/10/2020 0.4 0.0 - 1.2 mg/dL Final     Alkaline Phosphatase   Date Value Ref Range Status   09/10/2020 37 35 - 104 U/L Final     ALT   Date Value Ref Range Status   09/10/2020 16 0 - 32 U/L Final     AST   Date Value Ref Range Status   09/10/2020 19 0 - 31 U/L Final     No results found for: PTT, INR  No results found for: CHOLTOT, TRIG, HDL  No components found for: HGBA1C  No results found for: PROTEINCSF, GLUCCSF, WBCCSF    Controlled Substance Monitoring:    Acute and Chronic Pain Monitoring:   No flowsheet data found. MEDICAL DECISION MAKING  ASSESSMENT/PLAN    [facility] was seen today for pain. Diagnoses and all orders for this visit:    Lumbar radiculopathy    Left arm and left leg numbness    Neck pain    Cervical degenerative disc disease    H/O Vitamin B12 deficiency    · The patient has been having symptoms of numbness and tingling in the right upper and lower extremities associated with neck pain and arm pain. She had a similar episode about 2 years ago. Etiology: Unclear at this time. · Check MRI brain to rule out any underlying demyelinating disease. MRI cervical spine to look for any cervical degenerative disc disease and cervical spinal stenosis. · Symptoms of right leg pain and numbness tingling and associated EMG findings points towards a chronic right-sided lumbosacral radiculopathy. · Start on gabapentin 300 mg at night for radicular symptoms. If her symptoms continue to get worse, she may need a lumbar MRI and physical therapy. · Continue with B12 shots for vitamin B12 deficiency. · Neuropathy panel: Nonrevealing    Return in about 4 months (around 3/16/2021). Thank you for involving me in the care of your patient. Today, I personally spent a great amount of time directly face-to-face time with the patient, of which greater than 50% was spent in patient education, counseling,about etiology management and diagnosis of multiple sclerosis, cervical spinal stenosis, lumbosacral radiculopathy. Side effects of medications including gabapentin were discussed in detail with the patient, verbalizes understanding and agrees to it. And coordination of care as described above. Patient's current medication list, allergies, problem list and results of all previously ordered testing and scans were reviewed at today's visit.       Rox Meadows MD GUILLERMO CARVALHO [facility] Neurology  [address], [place]

## 2020-11-25 ENCOUNTER — HOSPITAL ENCOUNTER (OUTPATIENT)
Dept: MRI IMAGING | Age: 40
Discharge: HOME | End: 2020-11-27
Payer: COMMERCIAL

## 2020-11-25 PROCEDURE — 70551 MRI BRAIN STEM W/O DYE: CPT

## 2020-11-25 PROCEDURE — 72141 MRI NECK SPINE W/O DYE: CPT

## 2020-11-30 ENCOUNTER — TELEPHONE (OUTPATIENT)
Dept: NEUROLOGY | Age: 40
End: 2020-11-30

## 2020-11-30 ENCOUNTER — NURSE ONLY (OUTPATIENT)
Dept: PRIMARY CARE CLINIC | Age: 40
End: 2020-11-30
Payer: COMMERCIAL

## 2020-11-30 PROCEDURE — 96372 THER/PROPH/DIAG INJ SC/IM: CPT | Performed by: FAMILY MEDICINE

## 2020-11-30 RX ORDER — CYANOCOBALAMIN 1000 UG/ML
1000 INJECTION INTRAMUSCULAR; SUBCUTANEOUS ONCE
Status: COMPLETED | OUTPATIENT
Start: 2020-11-30 | End: 2020-11-30

## 2020-11-30 RX ADMIN — CYANOCOBALAMIN 1000 MCG: 1000 INJECTION INTRAMUSCULAR; SUBCUTANEOUS at 09:57

## 2020-11-30 NOTE — TELEPHONE ENCOUNTER
Patient called [place] office for results of MRI brain and cervical that she had on 11/25/2020. Forwarding to Dr. Norma Thompson office.

## 2020-12-01 NOTE — TELEPHONE ENCOUNTER
Please inform the patient that the MRI brain shows very small few white spots. These are very nonspecific. And can be seen in people with migraines. Nothing to do. The neck MRI shows mild bulging disc. Nothing to do at this time.

## 2020-12-01 NOTE — RESULT ENCOUNTER NOTE
Please inform the patient that the brain MRI shows some white spots which are most likely related to the migraines. Nothing to do at this time.

## 2020-12-03 ENCOUNTER — TELEPHONE (OUTPATIENT)
Dept: NEUROLOGY | Age: 40
End: 2020-12-03

## 2020-12-03 NOTE — TELEPHONE ENCOUNTER
Scan ordered. She can also try taking the gabapentin 2 times daily instead of just once a day. If no improvement, bring her in.

## 2020-12-03 NOTE — TELEPHONE ENCOUNTER
Spoke with patient and informed her that Dr. Robbie Peterson ordered the MRI of the lumbar spine. Also informed her that Dr. Robbie Peterson stated to take the gabapentin 2 times daily. Call if no improvement.

## 2020-12-08 ENCOUNTER — HOSPITAL ENCOUNTER (OUTPATIENT)
Dept: MRI IMAGING | Age: 40
Discharge: HOME | End: 2020-12-10
Payer: COMMERCIAL

## 2020-12-08 PROCEDURE — 72148 MRI LUMBAR SPINE W/O DYE: CPT

## 2020-12-08 NOTE — RESULT ENCOUNTER NOTE
Please inform the patient that there is mild arthritis and disc bulge at the L4-L5 level causing mild stenosis. Otherwise MRI of the lumbar spine is normal.  Nothing to do at this time.

## 2020-12-09 ENCOUNTER — TELEPHONE (OUTPATIENT)
Dept: NEUROLOGY | Age: 40
End: 2020-12-09

## 2020-12-09 NOTE — TELEPHONE ENCOUNTER
----- Message from Dusty Cox MD sent at 12/8/2020  4:05 PM EST -----  Please inform the patient that there is mild arthritis and disc bulge at the L4-L5 level causing mild stenosis. Otherwise MRI of the lumbar spine is normal.  Nothing to do at this time.

## 2020-12-09 NOTE — TELEPHONE ENCOUNTER
Left message for patient and informed her that there is mild arthritis and disc bulge at the L4-L5 level causing mild stenosis. Otherwise MRI of the lumbar spine is normal.  Nothing to do at this time. Call back number given if needed.

## 2020-12-10 RX ORDER — GABAPENTIN 300 MG/1
300 CAPSULE ORAL 2 TIMES DAILY
Qty: 60 CAPSULE | Refills: 2 | Status: SHIPPED
Start: 2020-12-15 | End: 2021-12-29

## 2020-12-21 ENCOUNTER — OFFICE VISIT (OUTPATIENT)
Dept: PODIATRY | Age: 40
End: 2020-12-21
Payer: COMMERCIAL

## 2020-12-21 VITALS — BODY MASS INDEX: 23.04 KG/M2 | WEIGHT: 130 LBS | HEIGHT: 63 IN

## 2020-12-21 PROBLEM — R26.2 DIFFICULTY WALKING: Status: ACTIVE | Noted: 2020-12-21

## 2020-12-21 PROBLEM — M25.571 SINUS TARSITIS OF RIGHT FOOT: Status: ACTIVE | Noted: 2020-12-21

## 2020-12-21 PROBLEM — R60.0 LOCALIZED EDEMA: Status: ACTIVE | Noted: 2020-12-21

## 2020-12-21 PROBLEM — M79.671 RIGHT FOOT PAIN: Status: ACTIVE | Noted: 2020-12-21

## 2020-12-21 PROCEDURE — 99203 OFFICE O/P NEW LOW 30 MIN: CPT | Performed by: PODIATRIST

## 2020-12-21 PROCEDURE — 29580 STRAPPING UNNA BOOT: CPT | Performed by: PODIATRIST

## 2020-12-21 NOTE — PROGRESS NOTES
Patient is in today as a new patient for evaluation of right foot pain. Patient says the pain is around the top of her foot towards the ankle and wraps up around into her leg. Patient says the pain has occurred for the past 6 months. She has seen a neurologist also. Patient also got orthotics about 6 months ago.   pcp is Rocio Busby,

## 2020-12-21 NOTE — PROGRESS NOTES
20     Mary Pires Vivo    : 1980 Sex: female   Age: 47 y.o. Patient was referred by: Nish Moneg DO  Patient's PCP/Provider is:  Nish Monge DO    Subjective:    Patient is seen today for evaluation regarding chronic pain into her anterior hindfoot/ankle region. Chief Complaint   Patient presents with    Foot Pain     right foot        HPI: Patient stated the issues have been going on over the last 6 months. Area does cause a sharp shooting pain at times into the lateral hindfoot and ankle region. Over the last 2 weeks she has stopped her exercise activities due to increased discomfort in the area. She did try compression, ice, NSAIDs, and change in activities without symptom improvement noted. After the COVID-19 issues occurred in 2020 she started increasing her exercise activities and since then noticed discomfort into the right lower extremity. She was sent to neurology to rule out possible spinal issues and she did have multiple MRI testing without issues noted. She presented today to discuss further treatment options available. ROS:  Const: Positives and pertinent negatives as per HPI. Musculo: Denies symptoms other than stated above. Neuro: Denies symptoms other than stated above. Skin: Denies symptoms other than stated above. Current Medications:    Current Outpatient Medications:     gabapentin (NEURONTIN) 300 MG capsule, Take 1 capsule by mouth 2 times daily for 90 days.  Intended supply: 90 days, Disp: 60 capsule, Rfl: 2    norethindrone-ethinyl estradiol (BALZIVA) 0.4-35 MG-MCG per tablet, Take by mouth See Admin Instructions, Disp: , Rfl:     Multiple Vitamins-Minerals (THERAPEUTIC MULTIVITAMIN-MINERALS) tablet, Take 1 tablet by mouth daily LD 17, Disp: , Rfl:     Allergies:  No Known Allergies    Vitals:    20 0814   Weight: 130 lb (59 kg)   Height: 5' 3\" (1.6 m)        Past Medical History:   Diagnosis Date    Hx of non anemic vitamin B12 deficiency 10/6/2020    Neuropathy 10/6/2020    Right lumbosacral radiculopathy 10/9/2020     Family History   Problem Relation Age of Onset    Other Mother         ALS      Past Surgical History:   Procedure Laterality Date    TURBINATE RESECTION  08/17/2017    WISDOM TOOTH EXTRACTION       Social History     Tobacco Use    Smoking status: Never Smoker    Smokeless tobacco: Never Used   Substance Use Topics    Alcohol use: Yes     Alcohol/week: 2.0 standard drinks     Types: 2 Cans of beer per week     Comment: 2-3 beers 2-3 days a week     Drug use: Never           Diagnostic studies:    Xr Foot Right (min 3 Views)    Result Date: 12/21/2020  EXAMINATION: THREE XRAY VIEWS OF THE RIGHT FOOT 12/21/2020 8:13 am COMPARISON: None. HISTORY: ORDERING SYSTEM PROVIDED HISTORY: Right foot pain FINDINGS: There is no evidence of acute fracture. There is normal alignment of the tarsometatarsal joints. No acute joint abnormality. No focal osseous lesion. No focal soft tissue abnormality. No acute osseous abnormality. Mri Cervical Spine Wo Contrast    Result Date: 11/25/2020  EXAMINATION: MRI OF THE CERVICAL SPINE WITHOUT CONTRAST 11/25/2020 7:13 am TECHNIQUE: Multiplanar multisequence MRI of the cervical spine was performed without the administration of intravenous contrast. COMPARISON: None. HISTORY: ORDERING SYSTEM PROVIDED HISTORY: Cervical spinal stenosis TECHNOLOGIST PROVIDED HISTORY: Reason for exam:->Cervical spinal strenosis FINDINGS: BONES/ALIGNMENT: There is normal alignment of the spine. The vertebral body heights are maintained. The bone marrow signal appears unremarkable. SPINAL CORD: No abnormal cord signal is seen. SOFT TISSUES: No paraspinal mass identified. C2-C3: There is no significant disc protrusion, spinal canal stenosis or neural foraminal narrowing. C3-C4: There is no significant disc protrusion, spinal canal stenosis or neural foraminal narrowing.  C4-C5: There is no significant disc advised to contact the office immediately for reevaluation. Exam:  VASCULAR: Pedal pulses palpable right foot. Capillary fill time brisk digits 1 through 5 right foot. NEUROLOGICAL: Epicritic sensations intact right lower extremity. No paresthesias noted upon percussion lateral sural nerve distribution. DERMATOLOGICAL: Mild edematous issues noted without ecchymosis into the sinus tarsi region right hindfoot/ankle region. No skin abrasions or any signs of infection noted right lower extremity. MUSCULOSKELETAL: Tenderness noted to palpation along the ATF ligamentous course right ankle with direct palpation and active range of motion right ankle and subtalar joint. No tenderness into the posterior Achilles tendon region or plantar fascial region right foot. Plan Per Assessment  Sedrick Dates was seen today for foot pain. Diagnoses and all orders for this visit:    Sinus tarsitis of right foot    Right foot pain  -     XR FOOT RIGHT (MIN 3 VIEWS); Future    Localized edema    Difficulty walking        1. New patient evaluation and management  2. X-ray studies were reviewed with patient in detail today. 3. Compression dressing was recommended initial treatment regarding the edematous issues present right lower extremity. Dressing was applied as described above. Patient was advised continued limitation of her exercise activities until symptoms improve. 4. Patient will be followed up in 1 week's time for continued evaluation and care. If symptoms do not improve we will proceed with MRI evaluation to evaluate potential ligamentous injury. She was advised to call the office with any questions or concerns in the interim. Seen By:    Gwen Alexander DPM    Electronically signed by Gwen Alexander DPM on 12/21/2020 at 2:15 PM      This note was created using voice recognition software. The note was reviewed however may contain grammatical errors.

## 2020-12-28 ENCOUNTER — OFFICE VISIT (OUTPATIENT)
Dept: PODIATRY | Age: 40
End: 2020-12-28
Payer: COMMERCIAL

## 2020-12-28 ENCOUNTER — OFFICE VISIT (OUTPATIENT)
Dept: NEUROLOGY | Age: 40
End: 2020-12-28
Payer: COMMERCIAL

## 2020-12-28 ENCOUNTER — NURSE ONLY (OUTPATIENT)
Dept: PRIMARY CARE CLINIC | Age: 40
End: 2020-12-28
Payer: COMMERCIAL

## 2020-12-28 VITALS
RESPIRATION RATE: 16 BRPM | DIASTOLIC BLOOD PRESSURE: 76 MMHG | WEIGHT: 130 LBS | HEIGHT: 63 IN | SYSTOLIC BLOOD PRESSURE: 119 MMHG | BODY MASS INDEX: 23.04 KG/M2 | HEART RATE: 72 BPM | OXYGEN SATURATION: 98 % | TEMPERATURE: 97.2 F

## 2020-12-28 VITALS — HEIGHT: 63 IN | BODY MASS INDEX: 23.04 KG/M2 | WEIGHT: 130 LBS

## 2020-12-28 PROBLEM — M25.571 PAIN IN RIGHT ANKLE AND JOINTS OF RIGHT FOOT: Status: ACTIVE | Noted: 2020-12-28

## 2020-12-28 PROBLEM — M76.71 PERONEAL TENDONITIS, RIGHT: Status: ACTIVE | Noted: 2020-12-28

## 2020-12-28 PROCEDURE — 96372 THER/PROPH/DIAG INJ SC/IM: CPT | Performed by: FAMILY MEDICINE

## 2020-12-28 PROCEDURE — 99213 OFFICE O/P EST LOW 20 MIN: CPT | Performed by: PODIATRIST

## 2020-12-28 PROCEDURE — 99214 OFFICE O/P EST MOD 30 MIN: CPT | Performed by: PSYCHIATRY & NEUROLOGY

## 2020-12-28 RX ORDER — CYANOCOBALAMIN 1000 UG/ML
1000 INJECTION INTRAMUSCULAR; SUBCUTANEOUS ONCE
Status: COMPLETED | OUTPATIENT
Start: 2020-12-28 | End: 2020-12-28

## 2020-12-28 RX ADMIN — CYANOCOBALAMIN 1000 MCG: 1000 INJECTION INTRAMUSCULAR; SUBCUTANEOUS at 10:05

## 2020-12-28 ASSESSMENT — ENCOUNTER SYMPTOMS
TROUBLE SWALLOWING: 0
SHORTNESS OF BREATH: 0
PHOTOPHOBIA: 0
VOMITING: 0
NAUSEA: 0

## 2020-12-28 NOTE — PROGRESS NOTES
20     [facility] Vivo    : 1980   Sex: female    Age: 47 y.o. Patient's PCP/Provider is:  Malina Garduno DO    Subjective:  Patient is seen today for follow-up regarding to continued issues lateral aspect right ankle/hindfoot region. Patient stated that the compression dressing helped mildly with her symptoms. She has been resting the right lower extremity, utilizing ice and other anti-inflammatory methods without symptom improvement noted. She wanted to discuss other potential treatment options available at this time. No other additional abnormalities noted. Chief Complaint   Patient presents with    Foot Pain     right       ROS:  Const: Positives and pertinent negatives as per HPI. Musculo: Denies symptoms other than stated above. Neuro: Denies symptoms other than stated above. Skin: Denies symptoms other than stated above. Current Medications:    Current Outpatient Medications:     gabapentin (NEURONTIN) 300 MG capsule, Take 1 capsule by mouth 2 times daily for 90 days. Intended supply: 90 days, Disp: 60 capsule, Rfl: 2    norethindrone-ethinyl estradiol (BALZIVA) 0.4-35 MG-MCG per tablet, Take by mouth See Admin Instructions, Disp: , Rfl:     Allergies:  No Known Allergies    Vitals:    20 0943   Weight: 130 lb (59 kg)   Height: 5' 3\" (1.6 m)       Exam:  Neurovascular status unchanged. Tenderness noted to palpation to the anterior lateral right ankle/hindfoot region with palpation and attempted range of motion. Tenderness noted along the course of the lateral peroneal tendon complex. Residual edematous issues noted in the area without ecchymotic skin changes noted. No paresthesias noted upon percussion sural nerve distribution right ankle. Diagnostic Studies:       Xr Foot Right (min 3 Views)    Result Date: 2020  EXAMINATION: THREE XRAY VIEWS OF THE RIGHT FOOT 2020 8:13 am COMPARISON: None.  HISTORY: ORDERING SYSTEM PROVIDED HISTORY: Right foot pain FINDINGS: There is no evidence of acute fracture. There is normal alignment of the tarsometatarsal joints. No acute joint abnormality. No focal osseous lesion. No focal soft tissue abnormality. No acute osseous abnormality. Mri Lumbar Spine Wo Contrast    Result Date: 12/8/2020  EXAMINATION: MRI OF THE LUMBAR SPINE WITHOUT CONTRAST, 12/8/2020 2:20 pm TECHNIQUE: Multiplanar multisequence MRI of the lumbar spine was performed without the administration of intravenous contrast. COMPARISON: None. HISTORY: ORDERING SYSTEM PROVIDED HISTORY: Spinal stenosis of lumbar region, unspecified whether neurogenic claudication present TECHNOLOGIST PROVIDED HISTORY: Reason for exam:->Lumbar spinal stenosis FINDINGS: BONES/ALIGNMENT: There is normal alignment of the spine. The vertebral body heights are maintained. The bone marrow signal appears unremarkable. SPINAL CORD: The conus terminates normally. SOFT TISSUES: No paraspinal mass identified. L1-L2: There is no significant disc herniation, spinal canal stenosis or neural foraminal narrowing. L2-L3: There is no significant disc herniation, spinal canal stenosis or neural foraminal narrowing. L3-L4: There is no significant disc herniation, spinal canal stenosis or neural foraminal narrowing. L4-L5: Minimal disc bulge. Mild facet and ligamentous hypertrophy. No significant central canal stenosis. Mild lateral recess and neural foraminal stenoses. L5-S1: There is no significant disc herniation, spinal canal stenosis or neural foraminal narrowing. 1. Mild degenerative changes at L4-5 resulting in mild stenoses of the lateral recess and neural foramina. Otherwise, unremarkable study. 2. No central canal stenosis. Procedures:    None    Plan Per Assessment  Lizbeth Terna was seen today for foot pain.     Diagnoses and all orders for this visit:    Sinus tarsitis of right foot    Peroneal tendonitis, right    Pain in right ankle and joints of right foot    Difficulty

## 2020-12-28 NOTE — PROGRESS NOTES
NEUROLOGY FOLLOW UP NOTE     Date: 12/28/2020  Name: Carmen Vaughn  MRN: [id number]  Patient's PCP: Lorenzo Richards DO     Dear, Dr. Lorenzo Richards DO    REASON FOR VISIT/CHIEF COMPLAINT: Right arm and right leg numbness tingling and pain. Interval history:  The patient is coming in for a follow-up visit. Reports that the right arm and right radicular leg pain has been improved after starting on gabapentin. Currently she takes 300 mg, 1 tablet 2 times a day. There has been intermittent worsening of the symptoms. She had a brain MRI which did not reveal any acute abnormality. MRI of the cervical spine showed mild disc bulge with tiny annular fissure at C5-C6  MRI of the lumbar spine showed mild degenerative changes at the L4-5 level resulting in mild stenosis of the lateral recess and neural foramina. No other aggravating or relieving factors  No other associated symptoms    Disease course:   Carmen Vaughn is a 47 y.o.  female past medical history of vitamin B12 deficiency, currently on vitamin B12 shots. The patient is coming in with numbness and tingling. The patient reports that her initial symptoms started about 2 years ago which involved numbness and tingling in the right arm and right leg, lasted for about 1 to 2 months and then resolve on its own. Pain in the right leg and numbness. She also endorses back pain which is worse on standing and working out. She also has pain when laying down at night. Patient reports that whenever she works out, her symptoms started after. No history of vision loss, speech problem swallowing problem, slurred speech, facial numbness. Neuropathy work-up: Nonrevealing  EMG: Right L5-S1 radiculopathy  Lumbar X-ray: Normal  No history of diabetes. Drinks alcohol socially  No history of cancer, or exposure to toxic substances. Symptoms interfere with activities of daily living.   No weakness, no falls, no history of chemotherapy she has not tried any treatments for this. EMG 10/09/2020:  Done by Dr. Ansley Bustos    Electrodiagnosis: NCS/EMG examination performed of the right extremities shows electrodiagnostic evidence for a chronic right lumbosacral radiculopathy (I.e., primarily L5/S1 myotomal distribution) with chronic denervation of a mild-moderate degree. Labs: SPEP, serum immunofixation, B1, B6, SSA, SSB, RPR, CK, aldolase, Lyme, zinc, copper, hepatitis C, ACE, ANCA, vitamin E, hemoglobin H1X, K81, folic acid, vitamin D, ESR, rheumatoid factor, TOOTIE: Normal    MRI brain: November 2020:  No acute intracranial abnormality.  No acute infarct. 2. A few nonspecific punctate foci T2 FLAIR hyperintensity are seen   predominately within the right frontal lobe white matter. MRI cervical spine November 2020:  1. Mild disc bulge with tiny annular fissure at C5-6.   2. No evidence of significant central canal stenosis or disc herniation. MRI lumbar spine 12/20:   1. Mild degenerative changes at L4-5 resulting in mild stenoses of the   lateral recess and neural foramina.  Otherwise, unremarkable study. 2. No central canal stenosis.      PAST MEDICAL HISTORY:   Past Medical History:   Diagnosis Date    Hx of non anemic vitamin B12 deficiency 10/6/2020    Neuropathy 10/6/2020    Right lumbosacral radiculopathy 10/9/2020     PAST SURGICAL HISTORY:   Past Surgical History:   Procedure Laterality Date    TURBINATE RESECTION  08/17/2017    WISDOM TOOTH EXTRACTION       FAMILY MEDICAL HISTORY:   Family History   Problem Relation Age of Onset    Other Mother         ALS      SOCIAL HISTORY:   Social History     Socioeconomic History    Marital status: Single     Spouse name: None    Number of children: None    Years of education: None    Highest education level: None   Occupational History    None   Social Needs    Financial resource strain: None    Food insecurity     Worry: None     Inability: None    Transportation needs     Medical: None     Non-medical: None   Tobacco Use    Smoking status: Never Smoker    Smokeless tobacco: Never Used   Substance and Sexual Activity    Alcohol use: Yes     Alcohol/week: 2.0 standard drinks     Types: 2 Cans of beer per week     Comment: 2-3 beers 2-3 days a week     Drug use: Never    Sexual activity: None   Lifestyle    Physical activity     Days per week: None     Minutes per session: None    Stress: None   Relationships    Social connections     Talks on phone: None     Gets together: None     Attends Nondenominational service: None     Active member of club or organization: None     Attends meetings of clubs or organizations: None     Relationship status: None    Intimate partner violence     Fear of current or ex partner: None     Emotionally abused: None     Physically abused: None     Forced sexual activity: None   Other Topics Concern    None   Social History Narrative    None      E-Cigarettes/Vaping Use     Questions Responses    E-Cigarette/Vaping Use Never User    Start Date     Passive Exposure     Quit Date     Counseling Given     Comments          Allergy: No Known Allergies  MEDS:   Current Outpatient Medications:     gabapentin (NEURONTIN) 300 MG capsule, Take 1 capsule by mouth 2 times daily for 90 days. Intended supply: 90 days, Disp: 60 capsule, Rfl: 2    norethindrone-ethinyl estradiol (BALZIVA) 0.4-35 MG-MCG per tablet, Take by mouth See Admin Instructions, Disp: , Rfl:     Multiple Vitamins-Minerals (THERAPEUTIC MULTIVITAMIN-MINERALS) tablet, Take 1 tablet by mouth daily LD 8/14/17, Disp: , Rfl:     REVIEW OF SYSTEMS  Review of Systems   Constitutional: Negative for appetite change, fatigue and unexpected weight change. HENT: Negative for drooling, hearing loss, tinnitus and trouble swallowing. Eyes: Negative for photophobia and visual disturbance. Respiratory: Negative for shortness of breath. Cardiovascular: Negative for palpitations. Gastrointestinal: Negative for nausea and vomiting. Endocrine: Negative for polyuria. Genitourinary: Negative for flank pain. Musculoskeletal: Negative for neck pain and neck stiffness. Skin: Negative for rash. Allergic/Immunologic: Negative for food allergies. Neurological: Negative for dizziness, tremors, seizures, syncope, speech difficulty, weakness, light-headedness, numbness and headaches. Hematological: Negative for adenopathy. Psychiatric/Behavioral: Negative for agitation, behavioral problems and sleep disturbance. PHYSICAL EXAM:   /76   Pulse 72   Temp 97.2 °F (36.2 °C) (Temporal)   Resp 16   Ht 5' 3\" (1.6 m)   Wt 130 lb (59 kg)   SpO2 98%   BMI 23.03 kg/m²   GENERAL APPEARANCE: Alert, well-developed, well-nourished female in no acute distress. HEENT: Normocephalic and atraumatic. PERRL. Oropharynx unremarkable. PULM: Normal respiratory effort. No accessory muscle use. CV: RRR. ABDOMEN: Soft, nontender. EXTREMITIES: No obvious signs of vascular compromise. Pulses present. No cyanosis, clubbing or edema. SKIN: Clear; no rashes, lesions or skin breaks in exposed areas. NEURO:     Neurological examination     MENTAL STATUS: Patient awake and oriented to time, place, and person. Recent/remote memory normal. Attention span/concentration normal. Speech fluent. Good comprehension, naming, and repetition. Fund of knowledge appropriate for patient's level of education. Affect normal.    CRANIAL NERVES:  CN I: Not tested. CN II: Fundoscopic exam not performed. CN III, IV, VI: Pupils equal, round and reactive to light; extra ocular movements full and intact. CN V: Facial sensation normal.  CN VII: No facial asymmetry. CN VIII:  Hearing grossly normal bilaterally. No pathologic nystagmus or skew deviation. CN IX, X: Palate elevates symmetrically. CN XI: Shoulder shrug and chin rotation equal with intact strength. CN XII: Tongue protrusion midline. MOTOR: Normal bulk.  Tone normal and symmetric throughout. Strength 5/5 throughout. ABNORMAL MOVEMENTS/TREMORS: No     REFLEXES: DTRs 2+; normal and symmetric throughout. Plantar response downgoing. SENSATION: Sensation grossly intact to fine touch, pain/temperature, vibration and position. COORDINATION: Finger-to-nose and heel to shin normal for age and symmetric. Finger tapping and alternating movements normal.    STATION: Negative Romberg. GAIT:  Normal heel, toe and tandem; no ataxia. DIAGNOSTIC TESTS:     I have personally reviewed the most recent lab results:    Sodium   Date Value Ref Range Status   09/10/2020 137 132 - 146 mmol/L Final     Potassium   Date Value Ref Range Status   09/10/2020 3.8 3.5 - 5.0 mmol/L Final     Chloride   Date Value Ref Range Status   09/10/2020 95 (L) 98 - 107 mmol/L Final     CO2   Date Value Ref Range Status   09/10/2020 26 22 - 29 mmol/L Final     BUN   Date Value Ref Range Status   09/10/2020 19 6 - 20 mg/dL Final     CREATININE   Date Value Ref Range Status   09/10/2020 1.0 0.5 - 1.0 mg/dL Final     GFR Non-   Date Value Ref Range Status   09/10/2020 >60 >=60 mL/min/1.73 Final     Comment:     Chronic Kidney Disease: less than 60 ml/min/1.73 sq.m. Kidney Failure: less than 15 ml/min/1.73 sq.m. Results valid for patients 18 years and older.        Calcium   Date Value Ref Range Status   09/10/2020 9.9 8.6 - 10.2 mg/dL Final     Magnesium   Date Value Ref Range Status   10/06/2020 2.1 1.6 - 2.6 mg/dL Final     WBC   Date Value Ref Range Status   09/10/2020 8.3 4.5 - 11.5 E9/L Final     Hemoglobin   Date Value Ref Range Status   09/10/2020 13.6 11.5 - 15.5 g/dL Final     Hematocrit   Date Value Ref Range Status   09/10/2020 42.2 34.0 - 48.0 % Final     Platelets   Date Value Ref Range Status   09/10/2020 329 130 - 450 E9/L Final     Neutrophils %   Date Value Ref Range Status   09/10/2020 60.5 43.0 - 80.0 % Final     Monocytes %   Date Value Ref Range Status   09/10/2020 7.5 2.0 - 12.0 % Final     Total Protein   Date Value Ref Range Status   10/06/2020 6.7 6.4 - 8.3 g/dL Final   09/10/2020 7.2 6.4 - 8.3 g/dL Final     Total Bilirubin   Date Value Ref Range Status   09/10/2020 0.4 0.0 - 1.2 mg/dL Final     Alkaline Phosphatase   Date Value Ref Range Status   09/10/2020 37 35 - 104 U/L Final     ALT   Date Value Ref Range Status   09/10/2020 16 0 - 32 U/L Final     AST   Date Value Ref Range Status   09/10/2020 19 0 - 31 U/L Final     No results found for: PTT, INR  No results found for: CHOLTOT, TRIG, HDL  No components found for: HGBA1C  No results found for: PROTEINCSF, GLUCCSF, WBCCSF    Controlled Substance Monitoring:    Acute and Chronic Pain Monitoring:   No flowsheet data found. MEDICAL DECISION MAKING  ASSESSMENT/PLAN    Geraldine Ellis was seen today for pain. Diagnoses and all orders for this visit:    Numbness and tingling    H/O Vitamin B12 deficiency          Lumbar radiculopathy    Cervical radiculopathy  -     External Referral To Physical Therapy    · The patient has been having symptoms of numbness and tingling in the right upper and lower extremities associated with neck pain and arm pain. She had a similar episode about 2 years ago. Etiology: Unclear at this time. Differential includes cervical and lumbar radiculopathy. · MRI brain: No acute abnormality  · MRI cervical spine: C5-C6 annular disc bulge  · MRI lumbar spine: Mild L4-L5 DJD and mild foraminal narrowing. · She will be referred to physical therapy for cervical and lumbar radiculopathy  · Continue with B12 shots for vitamin B12 deficiency. · Neuropathy panel: Nonrevealing    Return in about 6 months (around 6/28/2021). Thank you for involving me in the care of your patient.     Today, I personally spent a great amount of time directly face-to-face time with the patient, of which greater than 50% was spent in patient education, counseling,about etiology management and diagnosis of cervical spinal stenosis, lumbosacral radiculopathy. lSide effects of medications including gabapentin were discussed in detail with the patient, verbaizes understanding and agrees to it. And coordination of care as described above. Patient's current medication list, allergies, problem list and results of all previously ordered testing and scans were reviewed at today's visit.       MD GUILLERMO Corey [facility] Neurology  [address] [place] [address]

## 2020-12-28 NOTE — PROGRESS NOTES
Patient here for follow up on unna wrap. Patient states that she is still in pain, but felt the wrap helped a little. Patient states that she did do a lot of walking last week, but nothing to strenuous.

## 2021-01-04 ENCOUNTER — OFFICE VISIT (OUTPATIENT)
Dept: PODIATRY | Age: 41
End: 2021-01-04
Payer: COMMERCIAL

## 2021-01-04 VITALS — WEIGHT: 130 LBS | HEIGHT: 63 IN | BODY MASS INDEX: 23.04 KG/M2

## 2021-01-04 DIAGNOSIS — M25.571 PAIN IN RIGHT ANKLE AND JOINTS OF RIGHT FOOT: ICD-10-CM

## 2021-01-04 DIAGNOSIS — S96.911A TEAR OF TENDON OF RIGHT ANKLE, INITIAL ENCOUNTER: Primary | ICD-10-CM

## 2021-01-04 DIAGNOSIS — R26.2 DIFFICULTY WALKING: ICD-10-CM

## 2021-01-04 PROBLEM — M79.671 RIGHT FOOT PAIN: Status: RESOLVED | Noted: 2020-12-21 | Resolved: 2021-01-04

## 2021-01-04 PROCEDURE — 99213 OFFICE O/P EST LOW 20 MIN: CPT | Performed by: PODIATRIST

## 2021-01-04 NOTE — PROGRESS NOTES
Patient here for follow up to discuss MRI results. Patient has no new concerns.       Electronically signed by Bryan Walter MA on 1/4/2021 at 12:59 PM

## 2021-01-04 NOTE — PROGRESS NOTES
21     Rita Cody    : 1980   Sex: female    Age: 47 y.o. Patient's PCP/Provider is:  Cari Torres DO    Subjective:  Patient is seen today for follow-up regarding continued pain and discomfort into her right lower extremity. Patient presents today to discuss MRI results. Patient is still having issues with everyday ambulatory activities with pain and discomfort into the anterior ankle and midfoot region. No other additional abnormalities noted at this time. Chief Complaint   Patient presents with    Results     MRI results       ROS:  Const: Positives and pertinent negatives as per HPI. Musculo: Denies symptoms other than stated above. Neuro: Denies symptoms other than stated above. Skin: Denies symptoms other than stated above. Current Medications:    Current Outpatient Medications:     gabapentin (NEURONTIN) 300 MG capsule, Take 1 capsule by mouth 2 times daily for 90 days. Intended supply: 90 days, Disp: 60 capsule, Rfl: 2    norethindrone-ethinyl estradiol (BALZIVA) 0.4-35 MG-MCG per tablet, Take by mouth See Admin Instructions, Disp: , Rfl:     Allergies:  No Known Allergies    Vitals:    21 1259   Weight: 130 lb (59 kg)   Height: 5' 3\" (1.6 m)       Exam:  VASCULAR: Pedal pulses palpable right foot. Capillary fill time brisk digits 1 through 5 right foot. NEUROLOGICAL: Epicritic sensations intact and symmetrical  DERMATOLOGICAL: No edema or ecchymotic skin changes present right lower extremity. No skin abrasions or any signs of infection noted right lower extremity. MUSCULOSKELETAL: Tenderness noted to palpation along the anterior extensor tendon complex right lower extremity with active range of motion and muscle testing performed. Diagnostic Studies:     Xr Foot Right (min 3 Views)    Result Date: 2020  EXAMINATION: THREE XRAY VIEWS OF THE RIGHT FOOT 2020 8:13 am COMPARISON: None.  HISTORY: ORDERING SYSTEM PROVIDED HISTORY: Right foot pain FINDINGS: There is no evidence of acute fracture. There is normal alignment of the tarsometatarsal joints. No acute joint abnormality. No focal osseous lesion. No focal soft tissue abnormality. No acute osseous abnormality. Mri Lumbar Spine Wo Contrast    Result Date: 12/8/2020  EXAMINATION: MRI OF THE LUMBAR SPINE WITHOUT CONTRAST, 12/8/2020 2:20 pm TECHNIQUE: Multiplanar multisequence MRI of the lumbar spine was performed without the administration of intravenous contrast. COMPARISON: None. HISTORY: ORDERING SYSTEM PROVIDED HISTORY: Spinal stenosis of lumbar region, unspecified whether neurogenic claudication present TECHNOLOGIST PROVIDED HISTORY: Reason for exam:->Lumbar spinal stenosis FINDINGS: BONES/ALIGNMENT: There is normal alignment of the spine. The vertebral body heights are maintained. The bone marrow signal appears unremarkable. SPINAL CORD: The conus terminates normally. SOFT TISSUES: No paraspinal mass identified. L1-L2: There is no significant disc herniation, spinal canal stenosis or neural foraminal narrowing. L2-L3: There is no significant disc herniation, spinal canal stenosis or neural foraminal narrowing. L3-L4: There is no significant disc herniation, spinal canal stenosis or neural foraminal narrowing. L4-L5: Minimal disc bulge. Mild facet and ligamentous hypertrophy. No significant central canal stenosis. Mild lateral recess and neural foraminal stenoses. L5-S1: There is no significant disc herniation, spinal canal stenosis or neural foraminal narrowing. 1. Mild degenerative changes at L4-5 resulting in mild stenoses of the lateral recess and neural foramina. Otherwise, unremarkable study. 2. No central canal stenosis. Procedures:    None    Plan Per Assessment  Vijaya Beeler was seen today for results. Diagnoses and all orders for this visit:    Tear of tendon of right ankle, initial encounter    Pain in right ankle and joints of right foot    Difficulty walking      1.  Evaluation and management  2. We did discuss MRI findings with patient in detail today. We did recommend surgical repair due to the interstitial tear of the extensor digitorum longus muscle anterior ankle region. Patient was in favor of this course of treatment due to the chronic nature of the symptoms and unrelenting issues with multiple care options provided. 3. The reason for surgery is due to failed conservative treatment and/or conservative treatment is not a viable option. It was discussed with the patient that compliance postoperatively is of utmost importance. Any deviation on behalf of the patient will decrease the chances of a successful outcome. The risks of surgery were discussed in detail with the patient. They include but are not limited to: Infection, failure, prolonged pain, swelling, numbness, recurrence, limited mobility, painful scar, reflex sympathetic dystrophy, over correction, under correction, and loss of limb/life. It was also discussed in detail that no guarantees could be made in regards to a good cosmetic result. The patient understands all of the potential complications. All questions were thoroughly answered and the patient consented to proceed with the proposed surgery. Consent is located in the patient record. The patient was counseled at length about the risks of inna Covid-19 during their perioperative period and any recovery window from their procedure. The patient was made aware that inna Covid-19  may worsen their prognosis for recovering from their procedure  and lend to a higher morbidity and/or mortality risk. All material risks, benefits, and reasonable alternatives including postponing the procedure were discussed. The patient does wish to proceed with the procedure at this time. 4. Patient will be set up for outpatient surgical intervention once necessary insurance verification is performed.   All evaluations, labs, testing will be performed prior to scheduling the procedure. Patient was also dispensed a cam walker on today's visit which she will utilize during the postoperative course of treatment. The patient was dispensed a/an pneumatic walker. It is medically necessary and within the standard of care for the patient's diagnosis. Its purpose is to immobilize the lower extremity, decrease edema, and promote healing of the affected area. The patient was instructed on is proper application and use. The patient was also instructed to watch for areas of running, irritation, blister formation, or any other signs of abnormal pressure. If this occurs, the patient is to contact the office immediately. The ABN was reviewed and signed by the patient prior to leaving this appointment. She was advised to call the office with any questions or concerns in the interim. Seen By:    Angie Riggins DPM    Electronically signed by Angie Riggins DPM on 1/4/2021 at 4:29 PM    This note was created using voice recognition software. The note was reviewed however may contain grammatical errors.

## 2021-01-14 ENCOUNTER — HOSPITAL ENCOUNTER (OUTPATIENT)
Age: 41
Discharge: HOME | End: 2021-01-16
Payer: COMMERCIAL

## 2021-01-14 DIAGNOSIS — S96.911S: ICD-10-CM

## 2021-01-14 PROCEDURE — U0003 INFECTIOUS AGENT DETECTION BY NUCLEIC ACID (DNA OR RNA); SEVERE ACUTE RESPIRATORY SYNDROME CORONAVIRUS 2 (SARS-COV-2) (CORONAVIRUS DISEASE [COVID-19]), AMPLIFIED PROBE TECHNIQUE, MAKING USE OF HIGH THROUGHPUT TECHNOLOGIES AS DESCRIBED BY CMS-2020-01-R: HCPCS

## 2021-01-16 LAB
SARS-COV-2: NOT DETECTED
SOURCE: NORMAL

## 2021-01-18 ENCOUNTER — OFFICE VISIT (OUTPATIENT)
Dept: PRIMARY CARE CLINIC | Age: 41
End: 2021-01-18
Payer: COMMERCIAL

## 2021-01-18 VITALS
HEIGHT: 63 IN | RESPIRATION RATE: 16 BRPM | OXYGEN SATURATION: 98 % | BODY MASS INDEX: 23.04 KG/M2 | SYSTOLIC BLOOD PRESSURE: 120 MMHG | TEMPERATURE: 97.6 F | WEIGHT: 130 LBS | HEART RATE: 80 BPM | DIASTOLIC BLOOD PRESSURE: 74 MMHG

## 2021-01-18 DIAGNOSIS — Z01.818 PREOP EXAMINATION: Primary | ICD-10-CM

## 2021-01-18 DIAGNOSIS — Z01.818 PREOP EXAMINATION: ICD-10-CM

## 2021-01-18 LAB
ALBUMIN SERPL-MCNC: 4.2 G/DL (ref 3.5–5.2)
ALP BLD-CCNC: 38 U/L (ref 35–104)
ALT SERPL-CCNC: 13 U/L (ref 0–32)
ANION GAP SERPL CALCULATED.3IONS-SCNC: 10 MMOL/L (ref 7–16)
AST SERPL-CCNC: 25 U/L (ref 0–31)
BASOPHILS ABSOLUTE: 0.05 E9/L (ref 0–0.2)
BASOPHILS RELATIVE PERCENT: 0.7 % (ref 0–2)
BILIRUB SERPL-MCNC: 0.3 MG/DL (ref 0–1.2)
BUN BLDV-MCNC: 12 MG/DL (ref 6–20)
CALCIUM SERPL-MCNC: 9.3 MG/DL (ref 8.6–10.2)
CHLORIDE BLD-SCNC: 102 MMOL/L (ref 98–107)
CO2: 26 MMOL/L (ref 22–29)
CREAT SERPL-MCNC: 1 MG/DL (ref 0.5–1)
EOSINOPHILS ABSOLUTE: 0.08 E9/L (ref 0.05–0.5)
EOSINOPHILS RELATIVE PERCENT: 1.1 % (ref 0–6)
GFR AFRICAN AMERICAN: >60
GFR NON-AFRICAN AMERICAN: >60 ML/MIN/1.73
GLUCOSE BLD-MCNC: 95 MG/DL (ref 74–99)
HCT VFR BLD CALC: 43 % (ref 34–48)
HEMOGLOBIN: 14.1 G/DL (ref 11.5–15.5)
IMMATURE GRANULOCYTES #: 0.01 E9/L
IMMATURE GRANULOCYTES %: 0.1 % (ref 0–5)
LYMPHOCYTES ABSOLUTE: 2.47 E9/L (ref 1.5–4)
LYMPHOCYTES RELATIVE PERCENT: 35.1 % (ref 20–42)
MCH RBC QN AUTO: 30.4 PG (ref 26–35)
MCHC RBC AUTO-ENTMCNC: 32.8 % (ref 32–34.5)
MCV RBC AUTO: 92.7 FL (ref 80–99.9)
MONOCYTES ABSOLUTE: 0.57 E9/L (ref 0.1–0.95)
MONOCYTES RELATIVE PERCENT: 8.1 % (ref 2–12)
NEUTROPHILS ABSOLUTE: 3.86 E9/L (ref 1.8–7.3)
NEUTROPHILS RELATIVE PERCENT: 54.9 % (ref 43–80)
PDW BLD-RTO: 12.7 FL (ref 11.5–15)
PLATELET # BLD: 322 E9/L (ref 130–450)
PMV BLD AUTO: 11.6 FL (ref 7–12)
POTASSIUM SERPL-SCNC: 4.3 MMOL/L (ref 3.5–5)
RBC # BLD: 4.64 E12/L (ref 3.5–5.5)
SODIUM BLD-SCNC: 138 MMOL/L (ref 132–146)
TOTAL PROTEIN: 7.3 G/DL (ref 6.4–8.3)
WBC # BLD: 7 E9/L (ref 4.5–11.5)

## 2021-01-18 PROCEDURE — 99214 OFFICE O/P EST MOD 30 MIN: CPT | Performed by: FAMILY MEDICINE

## 2021-01-18 ASSESSMENT — ENCOUNTER SYMPTOMS
COUGH: 0
PHOTOPHOBIA: 0
BACK PAIN: 0
SHORTNESS OF BREATH: 0
ABDOMINAL PAIN: 0
ALLERGIC/IMMUNOLOGIC NEGATIVE: 1
NAUSEA: 0
WHEEZING: 0
COLOR CHANGE: 0
FACIAL SWELLING: 0
SORE THROAT: 0
VOMITING: 0
BLOOD IN STOOL: 0
APNEA: 0
DIARRHEA: 0
CHEST TIGHTNESS: 0
SINUS PRESSURE: 0

## 2021-01-18 ASSESSMENT — PATIENT HEALTH QUESTIONNAIRE - PHQ9
SUM OF ALL RESPONSES TO PHQ QUESTIONS 1-9: 0
2. FEELING DOWN, DEPRESSED OR HOPELESS: 0
SUM OF ALL RESPONSES TO PHQ QUESTIONS 1-9: 0
SUM OF ALL RESPONSES TO PHQ9 QUESTIONS 1 & 2: 0
1. LITTLE INTEREST OR PLEASURE IN DOING THINGS: 0
SUM OF ALL RESPONSES TO PHQ QUESTIONS 1-9: 0

## 2021-01-18 NOTE — PROGRESS NOTES
Chief Complaint:   Chief Complaint   Patient presents with    Pre-op Exam       Ankle Pain   The incident occurred more than 1 week ago. There was no injury mechanism. The pain is present in the right ankle. The quality of the pain is described as aching. The pain is at a severity of 4/10. The pain is moderate. The pain has been worsening since onset. Patient Active Problem List   Diagnosis    Neuropathy    Hx of non anemic vitamin B12 deficiency    Right lumbosacral radiculopathy    Sinus tarsitis of right foot    Localized edema    Difficulty walking    Peroneal tendonitis, right    Pain in right ankle and joints of right foot    Tear of tendon of right ankle       Past Medical History:   Diagnosis Date    Hx of non anemic vitamin B12 deficiency 10/6/2020    Neuropathy 10/6/2020    Right lumbosacral radiculopathy 10/9/2020       Past Surgical History:   Procedure Laterality Date    TURBINATE RESECTION  08/17/2017    WISDOM TOOTH EXTRACTION         Current Outpatient Medications   Medication Sig Dispense Refill    gabapentin (NEURONTIN) 300 MG capsule Take 1 capsule by mouth 2 times daily for 90 days. Intended supply: 90 days 60 capsule 2    norethindrone-ethinyl estradiol (BALZIVA) 0.4-35 MG-MCG per tablet Take by mouth See Admin Instructions       No current facility-administered medications for this visit. No Known Allergies    Social History     Socioeconomic History    Marital status: Single     Spouse name: None    Number of children: None    Years of education: None    Highest education level: None   Occupational History    None   Social Needs    Financial resource strain: None    Food insecurity     Worry: None     Inability: None    Transportation needs     Medical: None     Non-medical: None   Tobacco Use    Smoking status: Never Smoker    Smokeless tobacco: Never Used   Substance and Sexual Activity    Alcohol use:  Yes     Alcohol/week: 2.0 standard drinks Types: 2 Cans of beer per week     Comment: 2-3 beers 2-3 days a week     Drug use: Never    Sexual activity: None   Lifestyle    Physical activity     Days per week: None     Minutes per session: None    Stress: None   Relationships    Social connections     Talks on phone: None     Gets together: None     Attends Buddhist service: None     Active member of club or organization: None     Attends meetings of clubs or organizations: None     Relationship status: None    Intimate partner violence     Fear of current or ex partner: None     Emotionally abused: None     Physically abused: None     Forced sexual activity: None   Other Topics Concern    None   Social History Narrative    None       Family History   Problem Relation Age of Onset    Other Mother         ALS          Review of Systems   Constitutional: Negative. HENT: Negative for congestion, facial swelling, hearing loss, nosebleeds, sinus pressure and sore throat. Eyes: Negative for photophobia and visual disturbance. Respiratory: Negative for apnea, cough, chest tightness, shortness of breath and wheezing. Cardiovascular: Negative for chest pain, palpitations and leg swelling. Gastrointestinal: Negative for abdominal pain, blood in stool, diarrhea, nausea and vomiting. Genitourinary: Negative for difficulty urinating, frequency and urgency. Musculoskeletal: Negative for arthralgias, back pain, joint swelling and myalgias. Skin: Negative for color change and rash. Allergic/Immunologic: Negative. Neurological: Negative for syncope, weakness, light-headedness and headaches. Hematological: Negative. Psychiatric/Behavioral: Negative for agitation, behavioral problems, confusion and self-injury. The patient is not nervous/anxious. All other systems reviewed and are negative. Physical Exam  Vitals signs and nursing note reviewed. Constitutional:       General: She is not in acute distress.      Appearance: She is well-developed. HENT:      Head: Normocephalic and atraumatic. Nose: Nose normal.   Eyes:      Conjunctiva/sclera: Conjunctivae normal.      Pupils: Pupils are equal, round, and reactive to light. Neck:      Musculoskeletal: Normal range of motion and neck supple. Thyroid: No thyromegaly. Vascular: No JVD. Cardiovascular:      Rate and Rhythm: Normal rate and regular rhythm. Heart sounds: Normal heart sounds. No murmur. No friction rub. No gallop. Pulmonary:      Effort: Pulmonary effort is normal. No respiratory distress. Breath sounds: Normal breath sounds. No wheezing. Abdominal:      General: Bowel sounds are normal. There is no distension. Palpations: Abdomen is soft. Tenderness: There is no abdominal tenderness. There is no guarding or rebound. Musculoskeletal: Normal range of motion. Right ankle: Tenderness. Lymphadenopathy:      Cervical: No cervical adenopathy. Skin:     General: Skin is warm and dry. Findings: No erythema or rash. Neurological:      Mental Status: She is alert and oriented to person, place, and time. Cranial Nerves: No cranial nerve deficit. Motor: No abnormal muscle tone. Coordination: Coordination normal.      Deep Tendon Reflexes: Reflexes are normal and symmetric. Psychiatric:         Behavior: Behavior normal.         Judgment: Judgment normal.                               ASSESSMENT/PLAN:    Aubree Fallon was seen today for pre-op exam.    Diagnoses and all orders for this visit:    Preop examination  -     Cancel: EKG 12 lead; Future  -     CBC Auto Differential; Future  -     Comprehensive Metabolic Panel;  Future      Medically cleared  for foot/ankle surgery      Kamron Madrigal DO    1/18/2021  12:45 PM

## 2021-01-20 ENCOUNTER — ANESTHESIA EVENT (OUTPATIENT)
Dept: OPERATING ROOM | Age: 41
End: 2021-01-20
Payer: COMMERCIAL

## 2021-01-20 ENCOUNTER — PREP FOR PROCEDURE (OUTPATIENT)
Dept: PODIATRY | Age: 41
End: 2021-01-20

## 2021-01-20 ENCOUNTER — TELEPHONE (OUTPATIENT)
Dept: PODIATRY | Age: 41
End: 2021-01-20

## 2021-01-20 RX ORDER — SODIUM CHLORIDE 0.9 % (FLUSH) 0.9 %
10 SYRINGE (ML) INJECTION EVERY 12 HOURS SCHEDULED
Status: CANCELLED | OUTPATIENT
Start: 2021-01-20

## 2021-01-20 RX ORDER — SODIUM CHLORIDE 0.9 % (FLUSH) 0.9 %
10 SYRINGE (ML) INJECTION PRN
Status: CANCELLED | OUTPATIENT
Start: 2021-01-20

## 2021-01-20 ASSESSMENT — LIFESTYLE VARIABLES: SMOKING_STATUS: 0

## 2021-01-20 NOTE — ANESTHESIA PRE PROCEDURE
Department of Anesthesiology  Preprocedure Note       Name:  Desirae Orozco   Age:  47 y.o.  :  1980                                          MRN:  [id number]         Date:  2021      Surgeon: Bernard Ng):  Colonel Gene DPM    Procedure: Procedure(s):  REPAIR RIGHT EXTENSOR DIGITORUM LONGUS TENDON WITH GRAFT (TENDON GRAFT-ARTHREX) (CPT 77370)    Medications prior to admission:   Prior to Admission medications    Medication Sig Start Date End Date Taking? Authorizing Provider   gabapentin (NEURONTIN) 300 MG capsule Take 1 capsule by mouth 2 times daily for 90 days. Intended supply: 90 days 12/15/20 3/15/21  Hugo Senior MD   norethindrone-ethinyl estradiol Afshan Hole) 0.4-35 MG-MCG per tablet Take by mouth See Admin Instructions    Historical Provider, MD       Current medications:    No current facility-administered medications for this encounter. Current Outpatient Medications   Medication Sig Dispense Refill    gabapentin (NEURONTIN) 300 MG capsule Take 1 capsule by mouth 2 times daily for 90 days.  Intended supply: 90 days 60 capsule 2    norethindrone-ethinyl estradiol (BALZIVA) 0.4-35 MG-MCG per tablet Take by mouth See Admin Instructions         Allergies:  No Known Allergies    Problem List:    Patient Active Problem List   Diagnosis Code    Neuropathy G62.9    Hx of non anemic vitamin B12 deficiency Z86.39    Right lumbosacral radiculopathy M54.17    Sinus tarsitis of right foot M25.571    Localized edema R60.0    Difficulty walking R26.2    Peroneal tendonitis, right M76.71    Pain in right ankle and joints of right foot M25.571    Tear of tendon of right ankle S96.911A       Past Medical History:        Diagnosis Date    Hx of non anemic vitamin B12 deficiency 10/6/2020    Neuropathy 10/6/2020    Right lumbosacral radiculopathy 10/9/2020       Past Surgical History:        Procedure Laterality Date    TURBINATE RESECTION  2017    WISDOM TOOTH EXTRACTION COVID-19 Screening (If Applicable):   Lab Results   Component Value Date    COVID19 Not Detected 01/14/2021         Anesthesia Evaluation  Patient summary reviewed no history of anesthetic complications:   Airway: Mallampati: II  TM distance: >3 FB   Neck ROM: full  Mouth opening: > = 3 FB Dental: normal exam         Pulmonary: breath sounds clear to auscultation      (-) not a current smoker                           Cardiovascular:            Rhythm: regular  Rate: normal                    Neuro/Psych:   (+) neuromuscular disease ( Right lumbosacral radiculopathy):,             GI/Hepatic/Renal:        (-) no morbid obesity       Endo/Other:                     Abdominal:         (-) obese Abdomen: soft. Vascular:                                      Anesthesia Plan      MAC     ASA 2     (HCG negative)  Induction: intravenous. Anesthetic plan and risks discussed with patient. Plan discussed with CRNA. PAT Chart Review:  Chart reviewed per routine by Ivis Onofre MD.  Above represents information available via shared medical record including previous anesthesia history, drug and allergy history. Confirmation of above and final plan per Day of Surgery (DOS) anesthesiologist.        Ivis Onofre MD   1/20/2021        Pt seen questions answered plan outlined H&P reviewed pt examined accepts no interim changes.  Vivek Wheeler M.d 01/21/2021.0725

## 2021-01-20 NOTE — TELEPHONE ENCOUNTER
Maurice Mcwilliams called in to state she has surgery questions and would like a call back [phone].

## 2021-01-21 ENCOUNTER — HOSPITAL ENCOUNTER (OUTPATIENT)
Age: 41
Setting detail: OUTPATIENT SURGERY
Discharge: HOME | End: 2021-01-21
Attending: PODIATRIST | Admitting: PODIATRIST
Payer: COMMERCIAL

## 2021-01-21 ENCOUNTER — ANESTHESIA (OUTPATIENT)
Dept: OPERATING ROOM | Age: 41
End: 2021-01-21
Payer: COMMERCIAL

## 2021-01-21 VITALS
RESPIRATION RATE: 19 BRPM | OXYGEN SATURATION: 100 % | TEMPERATURE: 98.6 F | DIASTOLIC BLOOD PRESSURE: 49 MMHG | SYSTOLIC BLOOD PRESSURE: 87 MMHG

## 2021-01-21 VITALS
RESPIRATION RATE: 14 BRPM | HEIGHT: 63 IN | TEMPERATURE: 97 F | DIASTOLIC BLOOD PRESSURE: 73 MMHG | BODY MASS INDEX: 23.04 KG/M2 | SYSTOLIC BLOOD PRESSURE: 121 MMHG | HEART RATE: 80 BPM | OXYGEN SATURATION: 99 % | WEIGHT: 130 LBS

## 2021-01-21 DIAGNOSIS — M25.571 PAIN IN RIGHT ANKLE AND JOINTS OF RIGHT FOOT: ICD-10-CM

## 2021-01-21 DIAGNOSIS — S96.911D TEAR OF TENDON OF RIGHT ANKLE, SUBSEQUENT ENCOUNTER: ICD-10-CM

## 2021-01-21 DIAGNOSIS — S96.911S: Primary | ICD-10-CM

## 2021-01-21 LAB
HCG, URINE, POC: NEGATIVE
Lab: NORMAL
NEGATIVE QC PASS/FAIL: NORMAL
POSITIVE QC PASS/FAIL: NORMAL

## 2021-01-21 PROCEDURE — 6370000000 HC RX 637 (ALT 250 FOR IP)

## 2021-01-21 PROCEDURE — 6360000002 HC RX W HCPCS: Performed by: PODIATRIST

## 2021-01-21 PROCEDURE — 2500000003 HC RX 250 WO HCPCS: Performed by: PODIATRIST

## 2021-01-21 PROCEDURE — 2500000003 HC RX 250 WO HCPCS: Performed by: NURSE ANESTHETIST, CERTIFIED REGISTERED

## 2021-01-21 PROCEDURE — 3600000003 HC SURGERY LEVEL 3 BASE: Performed by: PODIATRIST

## 2021-01-21 PROCEDURE — 3600000013 HC SURGERY LEVEL 3 ADDTL 15MIN: Performed by: PODIATRIST

## 2021-01-21 PROCEDURE — 7100000000 HC PACU RECOVERY - FIRST 15 MIN: Performed by: PODIATRIST

## 2021-01-21 PROCEDURE — 7100000001 HC PACU RECOVERY - ADDTL 15 MIN: Performed by: PODIATRIST

## 2021-01-21 PROCEDURE — 2709999900 HC NON-CHARGEABLE SUPPLY: Performed by: PODIATRIST

## 2021-01-21 PROCEDURE — 7100000011 HC PHASE II RECOVERY - ADDTL 15 MIN: Performed by: PODIATRIST

## 2021-01-21 PROCEDURE — 6360000002 HC RX W HCPCS: Performed by: NURSE ANESTHETIST, CERTIFIED REGISTERED

## 2021-01-21 PROCEDURE — 3700000001 HC ADD 15 MINUTES (ANESTHESIA): Performed by: PODIATRIST

## 2021-01-21 PROCEDURE — 27665 REPAIR OF LEG TENDON EACH: CPT | Performed by: PODIATRIST

## 2021-01-21 PROCEDURE — 7100000010 HC PHASE II RECOVERY - FIRST 15 MIN: Performed by: PODIATRIST

## 2021-01-21 PROCEDURE — 81025 URINE PREGNANCY TEST: CPT | Performed by: PODIATRIST

## 2021-01-21 PROCEDURE — 2580000003 HC RX 258: Performed by: ANESTHESIOLOGY

## 2021-01-21 PROCEDURE — 3700000000 HC ANESTHESIA ATTENDED CARE: Performed by: PODIATRIST

## 2021-01-21 DEVICE — GRAFT HUM TISS W30XL40MM THK0.5MM ACELLULAR DERM RM TEMP: Type: IMPLANTABLE DEVICE | Site: FOOT | Status: FUNCTIONAL

## 2021-01-21 RX ORDER — KETOROLAC TROMETHAMINE 30 MG/ML
INJECTION, SOLUTION INTRAMUSCULAR; INTRAVENOUS PRN
Status: DISCONTINUED | OUTPATIENT
Start: 2021-01-21 | End: 2021-01-21 | Stop reason: SDUPTHER

## 2021-01-21 RX ORDER — HYDROCODONE BITARTRATE AND ACETAMINOPHEN 5; 325 MG/1; MG/1
TABLET ORAL
Status: COMPLETED
Start: 2021-01-21 | End: 2021-01-21

## 2021-01-21 RX ORDER — PROPOFOL 10 MG/ML
INJECTION, EMULSION INTRAVENOUS PRN
Status: DISCONTINUED | OUTPATIENT
Start: 2021-01-21 | End: 2021-01-21 | Stop reason: SDUPTHER

## 2021-01-21 RX ORDER — SODIUM CHLORIDE 0.9 % (FLUSH) 0.9 %
10 SYRINGE (ML) INJECTION EVERY 12 HOURS SCHEDULED
Status: DISCONTINUED | OUTPATIENT
Start: 2021-01-21 | End: 2021-01-21 | Stop reason: HOSPADM

## 2021-01-21 RX ORDER — LIDOCAINE HYDROCHLORIDE 20 MG/ML
INJECTION, SOLUTION INTRAVENOUS PRN
Status: DISCONTINUED | OUTPATIENT
Start: 2021-01-21 | End: 2021-01-21 | Stop reason: SDUPTHER

## 2021-01-21 RX ORDER — CEFAZOLIN SODIUM 2 G/50ML
2000 SOLUTION INTRAVENOUS
Status: COMPLETED | OUTPATIENT
Start: 2021-01-21 | End: 2021-01-21

## 2021-01-21 RX ORDER — HYDROCODONE BITARTRATE AND ACETAMINOPHEN 5; 325 MG/1; MG/1
2 TABLET ORAL PRN
Status: COMPLETED | OUTPATIENT
Start: 2021-01-21 | End: 2021-01-21

## 2021-01-21 RX ORDER — HYDROCODONE BITARTRATE AND ACETAMINOPHEN 5; 325 MG/1; MG/1
1 TABLET ORAL EVERY 6 HOURS PRN
Qty: 28 TABLET | Refills: 0 | Status: SHIPPED | OUTPATIENT
Start: 2021-01-21 | End: 2021-01-28

## 2021-01-21 RX ORDER — SODIUM CHLORIDE 0.9 % (FLUSH) 0.9 %
10 SYRINGE (ML) INJECTION PRN
Status: DISCONTINUED | OUTPATIENT
Start: 2021-01-21 | End: 2021-01-21 | Stop reason: HOSPADM

## 2021-01-21 RX ORDER — BUPIVACAINE HYDROCHLORIDE 5 MG/ML
INJECTION, SOLUTION EPIDURAL; INTRACAUDAL PRN
Status: DISCONTINUED | OUTPATIENT
Start: 2021-01-21 | End: 2021-01-21 | Stop reason: ALTCHOICE

## 2021-01-21 RX ORDER — FENTANYL CITRATE 50 UG/ML
INJECTION, SOLUTION INTRAMUSCULAR; INTRAVENOUS PRN
Status: DISCONTINUED | OUTPATIENT
Start: 2021-01-21 | End: 2021-01-21 | Stop reason: SDUPTHER

## 2021-01-21 RX ORDER — HYDROCODONE BITARTRATE AND ACETAMINOPHEN 5; 325 MG/1; MG/1
1 TABLET ORAL PRN
Status: COMPLETED | OUTPATIENT
Start: 2021-01-21 | End: 2021-01-21

## 2021-01-21 RX ORDER — GLYCOPYRROLATE 1 MG/5 ML
SYRINGE (ML) INTRAVENOUS PRN
Status: DISCONTINUED | OUTPATIENT
Start: 2021-01-21 | End: 2021-01-21 | Stop reason: SDUPTHER

## 2021-01-21 RX ORDER — DEXAMETHASONE SODIUM PHOSPHATE 10 MG/ML
INJECTION, SOLUTION INTRAMUSCULAR; INTRAVENOUS PRN
Status: DISCONTINUED | OUTPATIENT
Start: 2021-01-21 | End: 2021-01-21 | Stop reason: SDUPTHER

## 2021-01-21 RX ORDER — MIDAZOLAM HYDROCHLORIDE 1 MG/ML
INJECTION INTRAMUSCULAR; INTRAVENOUS PRN
Status: DISCONTINUED | OUTPATIENT
Start: 2021-01-21 | End: 2021-01-21 | Stop reason: SDUPTHER

## 2021-01-21 RX ORDER — ONDANSETRON 2 MG/ML
INJECTION INTRAMUSCULAR; INTRAVENOUS PRN
Status: DISCONTINUED | OUTPATIENT
Start: 2021-01-21 | End: 2021-01-21 | Stop reason: SDUPTHER

## 2021-01-21 RX ORDER — SODIUM CHLORIDE, SODIUM LACTATE, POTASSIUM CHLORIDE, CALCIUM CHLORIDE 600; 310; 30; 20 MG/100ML; MG/100ML; MG/100ML; MG/100ML
INJECTION, SOLUTION INTRAVENOUS CONTINUOUS
Status: DISCONTINUED | OUTPATIENT
Start: 2021-01-21 | End: 2021-01-21 | Stop reason: HOSPADM

## 2021-01-21 RX ADMIN — ONDANSETRON 4 MG: 2 INJECTION INTRAMUSCULAR; INTRAVENOUS at 08:46

## 2021-01-21 RX ADMIN — HYDROCODONE BITARTRATE AND ACETAMINOPHEN 1 TABLET: 5; 325 TABLET ORAL at 10:03

## 2021-01-21 RX ADMIN — FENTANYL CITRATE 100 MCG: 50 INJECTION, SOLUTION INTRAMUSCULAR; INTRAVENOUS at 08:23

## 2021-01-21 RX ADMIN — CEFAZOLIN SODIUM 2000 MG: 2 SOLUTION INTRAVENOUS at 08:20

## 2021-01-21 RX ADMIN — LIDOCAINE HYDROCHLORIDE 50 MG: 20 INJECTION, SOLUTION INTRAVENOUS at 08:23

## 2021-01-21 RX ADMIN — PROPOFOL 200 MG: 10 INJECTION, EMULSION INTRAVENOUS at 08:23

## 2021-01-21 RX ADMIN — SODIUM CHLORIDE, POTASSIUM CHLORIDE, SODIUM LACTATE AND CALCIUM CHLORIDE: 600; 310; 30; 20 INJECTION, SOLUTION INTRAVENOUS at 07:17

## 2021-01-21 RX ADMIN — Medication 0.2 MG: at 08:23

## 2021-01-21 RX ADMIN — KETOROLAC TROMETHAMINE 30 MG: 30 INJECTION, SOLUTION INTRAMUSCULAR; INTRAVENOUS at 08:59

## 2021-01-21 RX ADMIN — MIDAZOLAM 2 MG: 1 INJECTION INTRAMUSCULAR; INTRAVENOUS at 08:21

## 2021-01-21 RX ADMIN — DEXAMETHASONE SODIUM PHOSPHATE 10 MG: 10 INJECTION, SOLUTION INTRAMUSCULAR; INTRAVENOUS at 08:29

## 2021-01-21 ASSESSMENT — PULMONARY FUNCTION TESTS
PIF_VALUE: 3
PIF_VALUE: 12
PIF_VALUE: 10
PIF_VALUE: 12
PIF_VALUE: 12
PIF_VALUE: 5
PIF_VALUE: 12
PIF_VALUE: 4
PIF_VALUE: 12
PIF_VALUE: 10
PIF_VALUE: 16
PIF_VALUE: 12
PIF_VALUE: 10
PIF_VALUE: 16
PIF_VALUE: 10

## 2021-01-21 ASSESSMENT — PAIN SCALES - GENERAL
PAINLEVEL_OUTOF10: 0

## 2021-01-21 NOTE — PRE SEDATION
Update History & Physical     The patient's History and Physical this morning was reviewed with the patient and there were no significant changes. I examined the patient and there were no significant changes from the previous History and Physical.     Plan: The risk, benefits, expected outcome, and alternative to the recommended procedure have been discussed with the patient. Patient understands and wants to proceed with the procedure. The procedure discussed is 1. Repair extensor tendon right ankle.          Electronically signed by Yashira Batista DPM on 1/21/2021 at 8:14 AM

## 2021-01-21 NOTE — OP NOTE
Operative Note      Patient: Yang Gomes  YOB: 1980  MRN: [id number]    Date of Procedure: 1/21/2021    Pre-Op Diagnosis: 1. EXTENSOR DIGITORUM LONGUS TEAR RIGHT ANKLE  2. PAIN IN RIGHT ANKLE/FOOT    Post-Op Diagnosis: Same       Procedure(s):  REPAIR RIGHT EXTENSOR DIGITORUM LONGUS TENDON WITH GRAFT (TENDON GRAFT-ARTHREX) (CPT 42033)    Surgeon(s):  Homa Olguin., ROBIN    Assistant:   * No surgical staff found *    Anesthesia: General    Estimated Blood Loss (mL): Minimal    Complications: None    Specimens:   * No specimens in log *    Implants:  Implant Name Type Inv. Item Serial No.  Lot No. LRB No. Used Action   GRAFT HUM TISS [id number] THK0.5MM ACELLULAR DERM RM TEMP  GRAFT HUM TISS [id number] THK0.5MM ACELLULAR DERM RM TEMP  [employer] [place] TISSUE [facility]-  Right 1 Implanted         Drains: * No LDAs found *    Findings: Tenderness noted along the anterior extensor digitorum longus tibiotalar joint region right    Detailed Description of Procedure:   After proper preoperative evaluation, patient was brought back to the operating room placed operating table in the supine position. General anesthesia was administered per anesthesia department. Patient did receive 2 g of intravenous Ancef preoperatively. Tourniquet was placed around the patient's well-padded right calf region inflated to 275 mmHg then lowered to the surgical field once proper prepping and draping was performed. Attention was directed overlying the anterior lateral right ankle region along the course of the extensor digitorum longus tendon, or at this time approximately a 3 cm linear incision was made. Dissection was carried down into the subcutaneous and deep fascial regions, all vital structures were bovied and retracted as necessary. Deep fascial incision was made with a Metzenbaum scissors along the course of the extensor digitorum longus proximal to its distal separation into the separate tendon sheaths. At this time we did note the interstitial tear which did measure approximately 2 cm in length. Surgical site was flushed with copious amounts of saline. With use of 3-0 Vicryl the interstitial tear into the distal extensor digitorum longus common segment was repaired in a running stitch fashion. Once repair was completed, adequate range of motion of the digital regions and function of the EDL tendons was noted. To add adequate support and healing we did apply the arthro-Flex graft overlying the repair site. The graft was properly incorporated into the underlying tendon sheath and reapproximated with 3-0 Vicryl in a continuous running fashion. All excess graft tissue was properly resected sharply with a #15 blade to allow for proper postoperative healing. Surgical site was flushed with copious amounts of saline. Epidermal closure was then performed with 5-0 Monocryl in a subcuticular fashion. We did utilize a total of 10 cc of 0.5% Marcaine plain to anesthetize the surgical site right lower extremity. We did utilize Steri-Strips overlying the surgical site followed by Betadine soaked Adaptic and dry padded dressing right lower extremity. Tourniquet was released with total tourniquet time of 41 minutes noted, good vascularity was reestablished to the right foot and digital regions. The patient tolerated the procedure and anesthesia well and left the operating room in stable condition. The patient is being transported to the recovery room for post operative management. Patient and/or caregiver was given postoperative home-going instructions and prescriptions to be taken as directed. Patient and/or caregiver were advised to call the office with any questions or concerns prior to their scheduled postoperative appointment.         Electronically signed by Akhil Smith DPM on 1/21/2021 at 11:51 AM

## 2021-01-21 NOTE — ANESTHESIA POSTPROCEDURE EVALUATION
Department of Anesthesiology  Postprocedure Note    Patient: Casey Perez  MRN: [id number]  YOB: 1980  Date of evaluation: 1/21/2021  Time:  10:34 AM     Procedure Summary     Date: 01/21/21 Room / Location: [address] [place] [address]    Anesthesia Start: 5238 Anesthesia Stop: 6752    Procedure: REPAIR RIGHT EXTENSOR DIGITORUM LONGUS TENDON WITH GRAFT (TENDON GRAFT-ARTHREX) (CPT 49264) (Right ) Diagnosis: (PAIN IN RIGHT ANKLE/FOOT, TEAR TENDON RIGHT ANKLE)    Surgeons: Amparo Vazquez DPM Responsible Provider: Clint Briceno MD    Anesthesia Type: MAC ASA Status: 2          Anesthesia Type: MAC    Holli Phase I: Holli Score: 10    Holli Phase II: Holli Score: 10    Last vitals: Reviewed and per EMR flowsheets.        Anesthesia Post Evaluation    Patient location during evaluation: PACU  Patient participation: complete - patient participated  Level of consciousness: awake  Pain score: 3  Airway patency: patent  Nausea & Vomiting: no nausea  Complications: no  Cardiovascular status: blood pressure returned to baseline  Respiratory status: acceptable  Hydration status: euvolemic

## 2021-01-22 ENCOUNTER — TELEPHONE (OUTPATIENT)
Dept: PODIATRY | Age: 41
End: 2021-01-22

## 2021-01-22 NOTE — TELEPHONE ENCOUNTER
Left message on pt's phone inquiring her status from her surgery yesterday.            Electronically signed by Tawana Duverney, MA on 1/22/2021 at 12:43 PM

## 2021-01-27 ENCOUNTER — OFFICE VISIT (OUTPATIENT)
Dept: PODIATRY | Age: 41
End: 2021-01-27

## 2021-01-27 VITALS
WEIGHT: 125 LBS | HEIGHT: 63 IN | DIASTOLIC BLOOD PRESSURE: 78 MMHG | SYSTOLIC BLOOD PRESSURE: 120 MMHG | BODY MASS INDEX: 22.15 KG/M2

## 2021-01-27 DIAGNOSIS — S96.911D TEAR OF TENDON OF RIGHT ANKLE, SUBSEQUENT ENCOUNTER: Primary | ICD-10-CM

## 2021-01-27 PROBLEM — M25.571 PAIN IN RIGHT ANKLE AND JOINTS OF RIGHT FOOT: Status: RESOLVED | Noted: 2020-12-28 | Resolved: 2021-01-27

## 2021-01-27 PROBLEM — M25.571 SINUS TARSITIS OF RIGHT FOOT: Status: RESOLVED | Noted: 2020-12-21 | Resolved: 2021-01-27

## 2021-01-27 PROBLEM — M76.71 PERONEAL TENDONITIS, RIGHT: Status: RESOLVED | Noted: 2020-12-28 | Resolved: 2021-01-27

## 2021-01-27 PROCEDURE — 99024 POSTOP FOLLOW-UP VISIT: CPT | Performed by: PODIATRIST

## 2021-01-27 NOTE — PROGRESS NOTES
21     Tomer Dos Santos Vivo    : 1980   Sex: female    Age: 47 y.o. Patient's PCP/Provider is:  Aravind Al DO    Subjective:  Patient is seen today for follow-up regarding postoperative management tendon repair right ankle. Overall patient is doing well at this time without any additional issues noted. She denies any nausea, vomiting, fever, chills. She has kept the dressing clean, dry, and intact as instructed. She has remained nonweightbearing with use of crutches right lower extremity. No other additional abnormalities noted at this time. Chief Complaint   Patient presents with    Post-Op Check     post op sx 2021       ROS:  Const: Positives and pertinent negatives as per HPI. Musculo: Denies symptoms other than stated above. Neuro: Denies symptoms other than stated above. Skin: Denies symptoms other than stated above. Current Medications:    Current Outpatient Medications:     HYDROcodone-acetaminophen (NORCO) 5-325 MG per tablet, Take 1 tablet by mouth every 6 hours as needed for Pain for up to 7 days. Intended supply: 7 days. Take lowest dose possible to manage pain, Disp: 28 tablet, Rfl: 0    norethindrone-ethinyl estradiol (BALZIVA) 0.4-35 MG-MCG per tablet, Take by mouth See Admin Instructions, Disp: , Rfl:     gabapentin (NEURONTIN) 300 MG capsule, Take 1 capsule by mouth 2 times daily for 90 days. Intended supply: 90 days (Patient not taking: Reported on 2021), Disp: 60 capsule, Rfl: 2    Allergies:  No Known Allergies    Vitals:    21 1000   BP: 120/78   Site: Left Upper Arm   Position: Sitting   Weight: 125 lb (56.7 kg)   Height: 5' 3\" (1.6 m)       Exam:  Neurovascular status unchanged. Surgical site is well coapted with sutures and Steri-Strips intact anterior aspect right ankle. Minimal edema and ecchymosis noted surgical site right ankle. Adequate range of motion digital regions right foot.   No signs of infection noted surgical site right ankle.      Diagnostic Studies:     No results found. Procedures:    None    Plan Per Assessment  Risa Barrera was seen today for post-op check. Diagnoses and all orders for this visit:    Tear of tendon of right ankle, subsequent encounter      1. Postoperative evaluation and management  2. Surgical site was evaluated and dry dressing was reapplied right lower extremity. 3. Patient was advised continued nonweightbearing right lower extremity with crutch assistance. Patient was to elevate the right lower extremity throughout the day to allow for continued postoperative healing. She was advised passive range of motion digital regions right foot. 4. Patient will be followed up in 1 week's time or sooner if needed for continued postoperative care. Seen By:    Erin Jung DPM    Electronically signed by Erin Jung DPM on 1/27/2021 at 2:22 PM    This note was created using voice recognition software. The note was reviewed however may contain grammatical errors.

## 2021-01-27 NOTE — PROGRESS NOTES
Patient today presents for post op check from surgery 01/21/2021. Patient has pain mostly at night and would like to know if this is normal. No other concerns at this time.  pcp is Kristal Dick DO last office visit was 01/18/2021

## 2021-02-03 ENCOUNTER — OFFICE VISIT (OUTPATIENT)
Dept: PODIATRY | Age: 41
End: 2021-02-03

## 2021-02-03 VITALS — HEIGHT: 62 IN | WEIGHT: 130 LBS | BODY MASS INDEX: 23.92 KG/M2

## 2021-02-03 DIAGNOSIS — S96.911D TEAR OF TENDON OF RIGHT ANKLE, SUBSEQUENT ENCOUNTER: Primary | ICD-10-CM

## 2021-02-03 PROCEDURE — 99024 POSTOP FOLLOW-UP VISIT: CPT | Performed by: PODIATRIST

## 2021-02-03 NOTE — PROGRESS NOTES
Patient today presents for a 2 week follow up from surgery. Pain has gone down and she has no complaints at this time.    pcp is DO JASON Sutton 01/18/2021
dressed and appropriately cared for. 3. Patient was to remain nonweightbearing with use of crutches at this time for an additional week duration. 4. Patient will be followed up in 1 week's time for continued postoperative care. Suture removal will be performed at that visit. We will transition the patient into her ambulatory boot at that time as well. She was advised to call the office with any questions or concerns in the interim. Seen By:    Shahid Moore DPM    Electronically signed by Shahid Moore DPM on 2/3/2021 at 8:24 AM    This note was created using voice recognition software. The note was reviewed however may contain grammatical errors.

## 2021-02-10 ENCOUNTER — OFFICE VISIT (OUTPATIENT)
Dept: PODIATRY | Age: 41
End: 2021-02-10

## 2021-02-10 VITALS — BODY MASS INDEX: 23.04 KG/M2 | WEIGHT: 130 LBS | HEIGHT: 63 IN

## 2021-02-10 DIAGNOSIS — S96.911D TEAR OF TENDON OF RIGHT ANKLE, SUBSEQUENT ENCOUNTER: Primary | ICD-10-CM

## 2021-02-10 PROCEDURE — 99024 POSTOP FOLLOW-UP VISIT: CPT | Performed by: PODIATRIST

## 2021-02-10 NOTE — PROGRESS NOTES
Patient today presents for a post op appointment suture removal on the right foot.  Patient has had no problems in the last week/  pcp is DO JASON Lawrence 1/18/21

## 2021-02-10 NOTE — PROGRESS NOTES
2/10/21     [facility] Vivo    : 1980   Sex: female    Age: 47 y.o. Patient's PCP/Provider is:  Jacob Maza DO    Subjective:  Patient is seen today for follow-up regarding postoperative evaluation tendon repair right ankle. Overall patient is doing well at this time without any additional issues noted. Patient has remained nonweightbearing as instructed. She denies any additional issues at this time. ROS:  Const: Positives and pertinent negatives as per HPI. Musculo: Denies symptoms other than stated above. Neuro: Denies symptoms other than stated above. Skin: Denies symptoms other than stated above. Current Medications:    Current Outpatient Medications:     norethindrone-ethinyl estradiol (BALZIVA) 0.4-35 MG-MCG per tablet, Take by mouth See Admin Instructions, Disp: , Rfl:     gabapentin (NEURONTIN) 300 MG capsule, Take 1 capsule by mouth 2 times daily for 90 days. Intended supply: 90 days, Disp: 60 capsule, Rfl: 2    Allergies:  No Known Allergies    Vitals:    02/10/21 0904   Weight: 130 lb (59 kg)   Height: 5' 3\" (1.6 m)       Exam:  VASCULAR: Pulses palpable right foot. Capillary fill time brisk digits 1 through 5 right foot. NEUROLOGICAL: Epicritic sensations intact digital regions right foot  DERMATOLOGICAL: Incision site is well coapted without signs of dehiscence noted. Minimal edema and no ecchymotic skin changes present surgical site right ankle. MUSCULOSKELETAL: Increased range of motion noted digital regions right foot. Adequate range of motion right ankle and subtalar joint noted. Diagnostic Studies:     No results found. Procedures:    None    Plan Per Assessment  Diagnoses and all orders for this visit:    Tear of tendon of right ankle, subsequent encounter      1. Postoperative evaluation and management  2. Surgical site was evaluated and Steri-Strips and sutures were removed to patient tolerance.   3. We did recommend transitioning into her walking boot at this time and continued use of crutch for ambulatory assistance. 4. Patient will be followed up in 2 weeks time or sooner if needed for reevaluation. Physical therapy will be set up at this time to continued her postoperative rehabilitation. Seen By:    Mercy Rocha DPM    Electronically signed by Mercy Rocha DPM on 2/10/2021 at 2:22 PM    This note was created using voice recognition software. The note was reviewed however may contain grammatical errors.

## 2021-02-16 ENCOUNTER — TELEPHONE (OUTPATIENT)
Dept: PODIATRY | Age: 41
End: 2021-02-16

## 2021-02-16 NOTE — TELEPHONE ENCOUNTER
Chaka DEL CID from [facility] called to state Roberta Griffin was seen and would like to know if she has any weight baring restrictions. What is the post surgery protocol? Please advise - best fax number is [phone].  Thank you

## 2021-02-22 ENCOUNTER — OFFICE VISIT (OUTPATIENT)
Dept: PODIATRY | Age: 41
End: 2021-02-22

## 2021-02-22 VITALS — WEIGHT: 130 LBS | HEIGHT: 63 IN | BODY MASS INDEX: 23.04 KG/M2

## 2021-02-22 DIAGNOSIS — S96.911D TEAR OF TENDON OF RIGHT ANKLE, SUBSEQUENT ENCOUNTER: Primary | ICD-10-CM

## 2021-02-22 PROCEDURE — 99024 POSTOP FOLLOW-UP VISIT: CPT | Performed by: PODIATRIST

## 2021-02-22 NOTE — PROGRESS NOTES
Patient here for a post op check to the right foot. Patient has slight tenderness to the right side of the incision site, but no other complaints. Patient is overall doing very well.  Patient states she is not at full weight bearing on the foot but is using the crutch to help with assist.            Electronically signed by Desean Triplett MA on 2/22/2021 at 8:51 AM

## 2021-02-22 NOTE — PROGRESS NOTES
21     Vallery Hodgkins Vivo    : 1980   Sex: female    Age: 47 y.o. Patient's PCP/Provider is:  Abdirashid Sanford DO    Subjective:  Patient is seen today for follow-up regarding continued postoperative evaluation tendon repair right lower extremity. Overall patient is doing great at this time without any additional issues noted. Patient has improved with scheduled physical therapy at this point in time. Patient has been applying the vitamin E cream to the surgical incisional area as instructed with noted improvement. Patient presents wearing the cam walker and crutch for ambulatory assistance. No other additional abnormalities noted at this time. Chief Complaint   Patient presents with    Post-Op Check     right foot       ROS:  Const: Positives and pertinent negatives as per HPI. Musculo: Denies symptoms other than stated above. Neuro: Denies symptoms other than stated above. Skin: Denies symptoms other than stated above. Current Medications:    Current Outpatient Medications:     norethindrone-ethinyl estradiol (BALZIVA) 0.4-35 MG-MCG per tablet, Take by mouth See Admin Instructions, Disp: , Rfl:     gabapentin (NEURONTIN) 300 MG capsule, Take 1 capsule by mouth 2 times daily for 90 days. Intended supply: 90 days, Disp: 60 capsule, Rfl: 2    Allergies:  No Known Allergies    Vitals:    21 0851   Weight: 130 lb (59 kg)   Height: 5' 3\" (1.6 m)       Exam:  Vascular status grossly intact right lower extremity. Incisional area is healing without issues noted. No signs of surgical site dehiscence noted. No edema or ecchymotic skin changes present right lower extremity. Increased range of motion noted right ankle and subtalar joint. Diagnostic Studies:     No results found. Procedures:    None    Plan Per Assessment  Sree Whittington was seen today for post-op check. Diagnoses and all orders for this visit:    Tear of tendon of right ankle, subsequent encounter      1.  Postoperative

## 2021-03-04 DIAGNOSIS — M54.12 CERVICAL RADICULOPATHY: Primary | ICD-10-CM

## 2021-03-04 DIAGNOSIS — M54.16 LUMBAR RADICULOPATHY: ICD-10-CM

## 2021-03-15 ENCOUNTER — NURSE ONLY (OUTPATIENT)
Dept: PRIMARY CARE CLINIC | Age: 41
End: 2021-03-15
Payer: COMMERCIAL

## 2021-03-15 ENCOUNTER — OFFICE VISIT (OUTPATIENT)
Dept: PODIATRY | Age: 41
End: 2021-03-15

## 2021-03-15 VITALS — WEIGHT: 130 LBS | HEIGHT: 63 IN | BODY MASS INDEX: 23.04 KG/M2

## 2021-03-15 DIAGNOSIS — S96.911D TEAR OF TENDON OF RIGHT ANKLE, SUBSEQUENT ENCOUNTER: Primary | ICD-10-CM

## 2021-03-15 DIAGNOSIS — E53.8 B12 DEFICIENCY: Primary | ICD-10-CM

## 2021-03-15 PROCEDURE — 96372 THER/PROPH/DIAG INJ SC/IM: CPT | Performed by: FAMILY MEDICINE

## 2021-03-15 PROCEDURE — 99024 POSTOP FOLLOW-UP VISIT: CPT | Performed by: PODIATRIST

## 2021-03-15 RX ORDER — CYANOCOBALAMIN 1000 UG/ML
1000 INJECTION INTRAMUSCULAR; SUBCUTANEOUS ONCE
Status: COMPLETED | OUTPATIENT
Start: 2021-03-15 | End: 2021-03-15

## 2021-03-15 RX ADMIN — CYANOCOBALAMIN 1000 MCG: 1000 INJECTION INTRAMUSCULAR; SUBCUTANEOUS at 09:47

## 2021-03-15 NOTE — PROGRESS NOTES
3/15/21     Cam Hall Vivo    : 1980   Sex: female    Age: 47 y.o. Patient's PCP/Provider is:  Eris Dejesus DO    Subjective:  Patient is seen today for follow-up regarding continued postoperative management regarding tendon repair right ankle. Overall patient is doing great at this time without any additional issues noted. Patient is still undergoing physical therapy at this time regarding postoperative care. Patient denies any additional issues at this time. Chief Complaint   Patient presents with    Post-Op Check     1 week right        ROS:  Const: Positives and pertinent negatives as per HPI. Musculo: Denies symptoms other than stated above. Neuro: Denies symptoms other than stated above. Skin: Denies symptoms other than stated above. Current Medications:    Current Outpatient Medications:     norethindrone-ethinyl estradiol (BALZIVA) 0.4-35 MG-MCG per tablet, Take by mouth See Admin Instructions, Disp: , Rfl:     gabapentin (NEURONTIN) 300 MG capsule, Take 1 capsule by mouth 2 times daily for 90 days. Intended supply: 90 days, Disp: 60 capsule, Rfl: 2    Allergies:  No Known Allergies    Vitals:    03/15/21 0955   Weight: 130 lb (59 kg)   Height: 5' 3\" (1.6 m)       Exam:  VASCULAR: Pedal pulses palpable right foot. NEUROLOGICAL: Epicritic sensations intact right lower extremity  DERMATOLOGICAL: Incision site is well coapted without signs of dehiscence or any signs of infection noted. No edema or ecchymotic skin changes present right anterior ankle region. MUSCULOSKELETAL: Adequate range of motion ankle, subtalar joint, and MTPJ regions right lower extremity. Diagnostic Studies:     No results found. Procedures:    None    Plan Per Assessment  Ellie Saucedo was seen today for post-op check. Diagnoses and all orders for this visit:    Tear of tendon of right ankle, subsequent encounter      1. Postoperative evaluation and management  2.  We did advised the patient on appropriate foot care techniques and increasing her daily activities to tolerance. 3. Patient is to continue wearing her OTC insoles and good supportive shoe gear with all ambulatory activities. 4. Patient will be followed up at a later date for continued evaluation and care. Seen By:    Arcenio Chadwick DPM    Electronically signed by Arcenio Chadwick DPM on 3/15/2021 at 10:18 AM    This note was created using voice recognition software. The note was reviewed however may contain grammatical errors.

## 2021-03-15 NOTE — PROGRESS NOTES
Patient is here reschuduled 1 week right foot post op appointment. Patient has no concerns.  pcp Abdirashid Sanford DO   Electronically signed by Jenny Gavin LPN on 3/87/5445 at 4:46 AM

## 2021-03-27 ENCOUNTER — IMMUNIZATION (OUTPATIENT)
Dept: PRIMARY CARE CLINIC | Age: 41
End: 2021-03-27
Payer: COMMERCIAL

## 2021-03-27 PROCEDURE — 0011A COVID-19, MODERNA VACCINE 100MCG/0.5ML DOSE: CPT | Performed by: PHYSICIAN ASSISTANT

## 2021-03-27 PROCEDURE — 91301 COVID-19, MODERNA VACCINE 100MCG/0.5ML DOSE: CPT | Performed by: PHYSICIAN ASSISTANT

## 2021-04-26 ENCOUNTER — IMMUNIZATION (OUTPATIENT)
Dept: PRIMARY CARE CLINIC | Age: 41
End: 2021-04-26
Payer: COMMERCIAL

## 2021-04-26 PROCEDURE — 0012A COVID-19, MODERNA VACCINE 100MCG/0.5ML DOSE: CPT | Performed by: PHYSICIAN ASSISTANT

## 2021-04-26 PROCEDURE — 91301 COVID-19, MODERNA VACCINE 100MCG/0.5ML DOSE: CPT | Performed by: PHYSICIAN ASSISTANT

## 2021-08-18 ENCOUNTER — NURSE ONLY (OUTPATIENT)
Dept: PRIMARY CARE CLINIC | Age: 41
End: 2021-08-18
Payer: COMMERCIAL

## 2021-08-18 DIAGNOSIS — E53.8 B12 DEFICIENCY: Primary | ICD-10-CM

## 2021-08-18 PROCEDURE — 96372 THER/PROPH/DIAG INJ SC/IM: CPT | Performed by: FAMILY MEDICINE

## 2021-08-18 RX ORDER — CYANOCOBALAMIN 1000 UG/ML
1000 INJECTION INTRAMUSCULAR; SUBCUTANEOUS ONCE
Status: COMPLETED | OUTPATIENT
Start: 2021-08-18 | End: 2021-08-18

## 2021-08-18 RX ADMIN — CYANOCOBALAMIN 1000 MCG: 1000 INJECTION INTRAMUSCULAR; SUBCUTANEOUS at 11:26

## 2021-10-04 ENCOUNTER — OFFICE VISIT (OUTPATIENT)
Dept: PODIATRY | Age: 41
End: 2021-10-04
Payer: COMMERCIAL

## 2021-10-04 VITALS — HEIGHT: 63 IN | BODY MASS INDEX: 23.04 KG/M2 | WEIGHT: 130 LBS

## 2021-10-04 DIAGNOSIS — R26.2 DIFFICULTY WALKING: ICD-10-CM

## 2021-10-04 DIAGNOSIS — M79.671 RIGHT FOOT PAIN: ICD-10-CM

## 2021-10-04 DIAGNOSIS — M77.51 TENDONITIS OF ANKLE, RIGHT: Primary | ICD-10-CM

## 2021-10-04 PROCEDURE — 99213 OFFICE O/P EST LOW 20 MIN: CPT | Performed by: PODIATRIST

## 2021-11-01 ENCOUNTER — TELEPHONE (OUTPATIENT)
Dept: PRIMARY CARE CLINIC | Age: 41
End: 2021-11-01

## 2021-12-16 NOTE — PROGRESS NOTES
".   [facility]   HISTORY AND PHYSICAL    Patient Name: Paula Armstrong  : 1932  MRN: [id number]  Primary Care Physician:  Cornell Paulino MD  Date of admission: 12/15/Magen Garber MD;  Location: [facility] ENDOSCOPY;  Service: Pulmonary   • CHOLECYSTECTOMY     • HIP BIPOLAR REPLACEMENT Left    • OTHER SURGICAL HISTORY         Family History: family history is not on "
Patient is here for pain in right foot. Patient states pain the last few week. Patient states she has been working out but know known injury.  Claudia Soto DO  Electronically signed by Lizzy Baptiste LPN on 88/1/0843 at 86:93 AM
Romina Huang 
follow-up. Diagnoses and all orders for this visit:    Tendonitis of ankle, right    Right foot pain  -     XR FOOT RIGHT (MIN 3 VIEWS); Future  -     Amb External Referral For Orthotics    Difficulty walking      1. Evaluation and management  2. Radiograph's were ordered and reviewed 3 views right foot. No acute osseous abnormalities noted. 3. We did recommend getting her current foot orthoses evaluated and adjustments made to reduce current symptoms into both lower extremities. 4. Patient will be followed up at a later date for continued evaluation and management. She was advised on utilizing her compression sock with all ambulatory activities. Seen By:    Leona Ortiz DPM    Electronically signed by Leona Ortiz DPM on 10/4/2021 at 10:52 AM    This note was created using voice recognition software. The note was reviewed however may contain grammatical errors.
APRN    

## 2021-12-29 ENCOUNTER — OFFICE VISIT (OUTPATIENT)
Dept: FAMILY MEDICINE CLINIC | Age: 41
End: 2021-12-29
Payer: COMMERCIAL

## 2021-12-29 VITALS
WEIGHT: 132 LBS | OXYGEN SATURATION: 100 % | SYSTOLIC BLOOD PRESSURE: 122 MMHG | DIASTOLIC BLOOD PRESSURE: 80 MMHG | TEMPERATURE: 97 F | HEIGHT: 63 IN | HEART RATE: 61 BPM | BODY MASS INDEX: 23.39 KG/M2

## 2021-12-29 DIAGNOSIS — U07.1 2019 NOVEL CORONAVIRUS DISEASE (COVID-19): Primary | ICD-10-CM

## 2021-12-29 DIAGNOSIS — R53.83 FATIGUE, UNSPECIFIED TYPE: ICD-10-CM

## 2021-12-29 LAB
Lab: ABNORMAL
PERFORMING INSTRUMENT: ABNORMAL
QC PASS/FAIL: ABNORMAL
SARS-COV-2, POC: DETECTED

## 2021-12-29 PROCEDURE — 87426 SARSCOV CORONAVIRUS AG IA: CPT | Performed by: FAMILY MEDICINE

## 2021-12-29 PROCEDURE — 99213 OFFICE O/P EST LOW 20 MIN: CPT | Performed by: FAMILY MEDICINE

## 2021-12-29 NOTE — PROGRESS NOTES
Alie Mathews In    [address] [facility] presents to the office today for   Chief Complaint   Patient presents with    Congestion    Cough     Fatigue  Started 12/26 12/27 started sore throat  No fever  No loss of taste or smell  Nausea, no vomiting  Diarrhea today  No known exposure        Review of Systems     /80   Pulse 61   Temp 97 °F (36.1 °C) (Temporal)   Ht 5' 3\" (1.6 m)   Wt 132 lb (59.9 kg)   SpO2 100%   BMI 23.38 kg/m²   Physical Exam  Constitutional:       Appearance: Normal appearance. HENT:      Head: Normocephalic and atraumatic. Nose: Congestion present. Eyes:      Extraocular Movements: Extraocular movements intact. Conjunctiva/sclera: Conjunctivae normal.   Cardiovascular:      Rate and Rhythm: Normal rate. Heart sounds: Normal heart sounds. Pulmonary:      Effort: Pulmonary effort is normal.      Breath sounds: Normal breath sounds. Skin:     General: Skin is warm. Neurological:      Mental Status: She is alert and oriented to person, place, and time. Psychiatric:         Mood and Affect: Mood normal.         Behavior: Behavior normal.            Current Outpatient Medications:     norethindrone-ethinyl estradiol (Jose M Melo) 0.4-35 MG-MCG per tablet, Take by mouth See Admin Instructions, Disp: , Rfl:      Past Medical History:   Diagnosis Date    Hx of non anemic vitamin B12 deficiency 10/6/2020    Neuropathy 10/6/2020    Right lumbosacral radiculopathy 10/9/2020       Mat Cain was seen today for congestion and cough.     Diagnoses and all orders for this visit:    2019 novel coronavirus disease (COVID-19)    Fatigue, unspecified type  -     POCT COVID-19, Antigen       Rapid COVID is positive  Rest/fluids  5 day quarantine if afebrile and improving    Andrew Joshi MD

## 2022-01-24 ENCOUNTER — OFFICE VISIT (OUTPATIENT)
Dept: PRIMARY CARE CLINIC | Age: 42
End: 2022-01-24
Payer: COMMERCIAL

## 2022-01-24 VITALS
BODY MASS INDEX: 23.21 KG/M2 | TEMPERATURE: 97.2 F | OXYGEN SATURATION: 98 % | HEIGHT: 63 IN | SYSTOLIC BLOOD PRESSURE: 120 MMHG | HEART RATE: 64 BPM | WEIGHT: 131 LBS | DIASTOLIC BLOOD PRESSURE: 74 MMHG | RESPIRATION RATE: 18 BRPM

## 2022-01-24 DIAGNOSIS — Z00.01 ENCOUNTER FOR WELL ADULT EXAM WITH ABNORMAL FINDINGS: Primary | ICD-10-CM

## 2022-01-24 DIAGNOSIS — G62.9 NEUROPATHY: ICD-10-CM

## 2022-01-24 DIAGNOSIS — E78.5 HYPERLIPIDEMIA, UNSPECIFIED HYPERLIPIDEMIA TYPE: ICD-10-CM

## 2022-01-24 DIAGNOSIS — E53.8 B12 DEFICIENCY: ICD-10-CM

## 2022-01-24 DIAGNOSIS — R14.0 BLOATING: ICD-10-CM

## 2022-01-24 LAB
BASOPHILS ABSOLUTE: 0.04 E9/L (ref 0–0.2)
BASOPHILS RELATIVE PERCENT: 0.8 % (ref 0–2)
EOSINOPHILS ABSOLUTE: 0.07 E9/L (ref 0.05–0.5)
EOSINOPHILS RELATIVE PERCENT: 1.4 % (ref 0–6)
FOLATE: >20 NG/ML (ref 4.8–24.2)
HCT VFR BLD CALC: 42.6 % (ref 34–48)
HEMOGLOBIN: 13.7 G/DL (ref 11.5–15.5)
IMMATURE GRANULOCYTES #: 0.01 E9/L
IMMATURE GRANULOCYTES %: 0.2 % (ref 0–5)
LYMPHOCYTES ABSOLUTE: 2.5 E9/L (ref 1.5–4)
LYMPHOCYTES RELATIVE PERCENT: 49.4 % (ref 20–42)
MCH RBC QN AUTO: 30.3 PG (ref 26–35)
MCHC RBC AUTO-ENTMCNC: 32.2 % (ref 32–34.5)
MCV RBC AUTO: 94.2 FL (ref 80–99.9)
MONOCYTES ABSOLUTE: 0.45 E9/L (ref 0.1–0.95)
MONOCYTES RELATIVE PERCENT: 8.9 % (ref 2–12)
NEUTROPHILS ABSOLUTE: 1.99 E9/L (ref 1.8–7.3)
NEUTROPHILS RELATIVE PERCENT: 39.3 % (ref 43–80)
PDW BLD-RTO: 12.8 FL (ref 11.5–15)
PLATELET # BLD: 312 E9/L (ref 130–450)
PMV BLD AUTO: 11.9 FL (ref 7–12)
RBC # BLD: 4.52 E12/L (ref 3.5–5.5)
VITAMIN B-12: 523 PG/ML (ref 211–946)
WBC # BLD: 5.1 E9/L (ref 4.5–11.5)

## 2022-01-24 PROCEDURE — 99395 PREV VISIT EST AGE 18-39: CPT | Performed by: FAMILY MEDICINE

## 2022-01-24 PROCEDURE — 96372 THER/PROPH/DIAG INJ SC/IM: CPT | Performed by: FAMILY MEDICINE

## 2022-01-24 RX ORDER — CYANOCOBALAMIN 1000 UG/ML
1000 INJECTION INTRAMUSCULAR; SUBCUTANEOUS ONCE
Status: COMPLETED | OUTPATIENT
Start: 2022-01-24 | End: 2022-01-24

## 2022-01-24 RX ADMIN — CYANOCOBALAMIN 1000 MCG: 1000 INJECTION INTRAMUSCULAR; SUBCUTANEOUS at 14:38

## 2022-01-24 ASSESSMENT — PATIENT HEALTH QUESTIONNAIRE - PHQ9
SUM OF ALL RESPONSES TO PHQ QUESTIONS 1-9: 0
2. FEELING DOWN, DEPRESSED OR HOPELESS: 0
SUM OF ALL RESPONSES TO PHQ QUESTIONS 1-9: 0
1. LITTLE INTEREST OR PLEASURE IN DOING THINGS: 0
SUM OF ALL RESPONSES TO PHQ QUESTIONS 1-9: 0
SUM OF ALL RESPONSES TO PHQ9 QUESTIONS 1 & 2: 0
SUM OF ALL RESPONSES TO PHQ QUESTIONS 1-9: 0

## 2022-01-24 ASSESSMENT — ENCOUNTER SYMPTOMS
WHEEZING: 0
DIARRHEA: 0
COLOR CHANGE: 0
SORE THROAT: 0
SINUS PRESSURE: 0
BLOOD IN STOOL: 0
FACIAL SWELLING: 0
SHORTNESS OF BREATH: 0
ALLERGIC/IMMUNOLOGIC NEGATIVE: 1
PHOTOPHOBIA: 0
COUGH: 0
NAUSEA: 0
CHEST TIGHTNESS: 0
APNEA: 0
BACK PAIN: 0
ABDOMINAL PAIN: 0
VOMITING: 0

## 2022-01-24 NOTE — PROGRESS NOTES
Chief Complaint:   Chief Complaint   Patient presents with    Annual Exam   [facility]     for last week or so. HPIdoing well no new issues except occasional bloating      Patient Active Problem List   Diagnosis    Neuropathy    Hx of non anemic vitamin B12 deficiency    Right lumbosacral radiculopathy    Localized edema    Difficulty walking    Tear of tendon of right ankle    Tendonitis of ankle, right    Right foot pain       Past Medical History:   Diagnosis Date    Hx of non anemic vitamin B12 deficiency 10/6/2020    Neuropathy 10/6/2020    Right lumbosacral radiculopathy 10/9/2020       Past Surgical History:   Procedure Laterality Date    ANKLE SURGERY Right 1/21/2021    REPAIR RIGHT EXTENSOR DIGITORUM LONGUS TENDON WITH GRAFT (TENDON GRAFT-ARTHREX) (CPT 99757) performed by Anup Carranza DPM at [address] Right 01/21/2021    REPAIR EXTENSOR DIGITORUMLONGUE WITH GRANFT RIGHT    TURBINATE RESECTION  08/17/2017    WISDOM TOOTH EXTRACTION         Current Outpatient Medications   Medication Sig Dispense Refill    norethindrone-ethinyl estradiol (Lucian Loyola) 0.4-35 MG-MCG per tablet Take by mouth See Admin Instructions       Current Facility-Administered Medications   Medication Dose Route Frequency Provider Last Rate Last Admin    cyanocobalamin injection 1,000 mcg  1,000 mcg IntraMUSCular Once Alexx Amrita, DO           No Known Allergies    Social History     Socioeconomic History    Marital status: Single     Spouse name: None    Number of children: None    Years of education: None    Highest education level: None   Occupational History    None   Tobacco Use    Smoking status: Never Smoker    Smokeless tobacco: Never Used   Vaping Use    Vaping Use: Never used   Substance and Sexual Activity    Alcohol use:  Yes     Alcohol/week: 2.0 standard drinks     Types: 2 Cans of beer per week     Comment: 2-3 beers 2-3 days a week     Drug use: Never    Sexual activity: None   Other Topics Concern    None   Social History Narrative    None     Social Determinants of Health     Financial Resource Strain:     Difficulty of Paying Living Expenses: Not on file   Food Insecurity:     Worried About Running Out of Food in the Last Year: Not on file    Shilpa of Food in the Last Year: Not on file   Transportation Needs:     Lack of Transportation (Medical): Not on file    Lack of Transportation (Non-Medical): Not on file   Physical Activity:     Days of Exercise per Week: Not on file    Minutes of Exercise per Session: Not on file   Stress:     Feeling of Stress : Not on file   Social Connections:     Frequency of Communication with Friends and Family: Not on file    Frequency of Social Gatherings with Friends and Family: Not on file    Attends Scientologist Services: Not on file    Active Member of [address] [place] [address] or Organizations: Not on file    Attends Club or Organization Meetings: Not on file    Marital Status: Not on file   Intimate Partner Violence:     Fear of Current or Ex-Partner: Not on file    Emotionally Abused: Not on file    Physically Abused: Not on file    Sexually Abused: Not on file   Housing Stability:     Unable to Pay for Housing in the Last Year: Not on file    Number of [place] in the Last Year: Not on file    Unstable Housing in the Last Year: Not on file       Family History   Problem Relation Age of Onset    Other Mother         ALS          Review of Systems   Constitutional: Negative. HENT: Negative for congestion, facial swelling, hearing loss, nosebleeds, sinus pressure and sore throat. Eyes: Negative for photophobia and visual disturbance. Respiratory: Negative for apnea, cough, chest tightness, shortness of breath and wheezing. Cardiovascular: Negative for chest pain, palpitations and leg swelling. Gastrointestinal: Negative for abdominal pain, blood in stool, diarrhea, nausea and vomiting.    Genitourinary: Negative for difficulty urinating, frequency and urgency. Musculoskeletal: Negative for arthralgias, back pain, joint swelling and myalgias. Skin: Negative for color change and rash. Allergic/Immunologic: Negative. Neurological: Negative for syncope, weakness, light-headedness and headaches. Hematological: Negative. Psychiatric/Behavioral: Negative for agitation, behavioral problems, confusion and self-injury. The patient is not nervous/anxious. All other systems reviewed and are negative. Physical Exam  Vitals and nursing note reviewed. Constitutional:       General: She is not in acute distress. Appearance: She is well-developed. HENT:      Head: Normocephalic and atraumatic. Nose: Nose normal.   Eyes:      Conjunctiva/sclera: Conjunctivae normal.      Pupils: Pupils are equal, round, and reactive to light. Neck:      Thyroid: No thyromegaly. Vascular: No JVD. Cardiovascular:      Rate and Rhythm: Normal rate and regular rhythm. Heart sounds: Normal heart sounds. No murmur heard. No friction rub. No gallop. Pulmonary:      Effort: Pulmonary effort is normal. No respiratory distress. Breath sounds: Normal breath sounds. No wheezing. Abdominal:      General: Bowel sounds are normal. There is no distension. Palpations: Abdomen is soft. Tenderness: There is no abdominal tenderness. There is no guarding or rebound. Musculoskeletal:         General: Normal range of motion. Cervical back: Normal range of motion and neck supple. Lymphadenopathy:      Cervical: No cervical adenopathy. Skin:     General: Skin is warm and dry. Findings: No erythema or rash. Neurological:      Mental Status: She is alert and oriented to person, place, and time. Cranial Nerves: No cranial nerve deficit. Motor: No abnormal muscle tone. Coordination: Coordination normal.      Deep Tendon Reflexes: Reflexes are normal and symmetric. Psychiatric:         Behavior: Behavior normal.         Judgment: Judgment normal.                               ASSESSMENT/PLAN:    Rachna Benavides was seen today for annual exam and bloated.     Diagnoses and all orders for this visit:    Encounter for well adult exam with abnormal findings    Bloating    B12 deficiency  -     cyanocobalamin injection 1,000 mcg      Pt feels her symptoms not getting worse and will monitor symptoms      Villa Waite, DO    1/24/2022  2:27 PM

## 2022-01-25 LAB
ALBUMIN SERPL-MCNC: 4.3 G/DL (ref 3.5–5.2)
ALP BLD-CCNC: 37 U/L (ref 35–104)
ALT SERPL-CCNC: 18 U/L (ref 0–32)
ANION GAP SERPL CALCULATED.3IONS-SCNC: 20 MMOL/L (ref 7–16)
AST SERPL-CCNC: 28 U/L (ref 0–31)
BILIRUB SERPL-MCNC: 0.2 MG/DL (ref 0–1.2)
BUN BLDV-MCNC: 14 MG/DL (ref 6–20)
CALCIUM SERPL-MCNC: 9.8 MG/DL (ref 8.6–10.2)
CHLORIDE BLD-SCNC: 103 MMOL/L (ref 98–107)
CHOLESTEROL, TOTAL: 130 MG/DL (ref 0–199)
CO2: 19 MMOL/L (ref 22–29)
CREAT SERPL-MCNC: 1.1 MG/DL (ref 0.5–1)
GFR AFRICAN AMERICAN: >60
GFR NON-AFRICAN AMERICAN: 55 ML/MIN/1.73
GLUCOSE BLD-MCNC: 93 MG/DL (ref 74–99)
HDLC SERPL-MCNC: 66 MG/DL
LDL CHOLESTEROL CALCULATED: 49 MG/DL (ref 0–99)
POTASSIUM SERPL-SCNC: 4.2 MMOL/L (ref 3.5–5)
SODIUM BLD-SCNC: 142 MMOL/L (ref 132–146)
TOTAL PROTEIN: 7 G/DL (ref 6.4–8.3)
TRIGL SERPL-MCNC: 76 MG/DL (ref 0–149)
TSH SERPL DL<=0.05 MIU/L-ACNC: 2.14 UIU/ML (ref 0.27–4.2)
VLDLC SERPL CALC-MCNC: 15 MG/DL

## 2022-04-21 ENCOUNTER — INITIAL CONSULT (OUTPATIENT)
Dept: GASTROENTEROLOGY | Age: 42
End: 2022-04-21
Payer: COMMERCIAL

## 2022-04-21 VITALS
SYSTOLIC BLOOD PRESSURE: 115 MMHG | DIASTOLIC BLOOD PRESSURE: 72 MMHG | HEART RATE: 69 BPM | WEIGHT: 127 LBS | HEIGHT: 63 IN | BODY MASS INDEX: 22.5 KG/M2

## 2022-04-21 DIAGNOSIS — K59.00 CONSTIPATION, UNSPECIFIED CONSTIPATION TYPE: ICD-10-CM

## 2022-04-21 DIAGNOSIS — K59.00 CONSTIPATION, UNSPECIFIED CONSTIPATION TYPE: Primary | ICD-10-CM

## 2022-04-21 LAB
C-REACTIVE PROTEIN: 5.1 MG/DL (ref 0–0.4)
SEDIMENTATION RATE, ERYTHROCYTE: 2 MM/HR (ref 0–20)

## 2022-04-21 PROCEDURE — 99202 OFFICE O/P NEW SF 15 MIN: CPT | Performed by: NURSE PRACTITIONER

## 2022-04-21 RX ORDER — SODIUM, POTASSIUM,MAG SULFATES 17.5-3.13G
1 SOLUTION, RECONSTITUTED, ORAL ORAL ONCE
Qty: 1 EACH | Refills: 0 | Status: SHIPPED | OUTPATIENT
Start: 2022-04-21 | End: 2022-04-21

## 2022-04-21 NOTE — PROGRESS NOTES
Noemy Cody (:  1980) is a 46 y.o. female, here for evaluation of the following chief complaint(s):  Abdominal Pain (ref from dr Julius Blackman for abdominal pain)      SUBJECTIVE/OBJECTIVE:  HPI:    Pretty Sanchez is a very pleasant 46year old female that presents today with complaints of bloating and constipation x 2 months. Had an ultrasound by OB noted stool retention. Never feels fully evacuated. Has taken Metamucil for years. Denies abdominal pain or nausea. No rectal bleeding or weight loss. No family history of CRC or IBD. Has tried miralax with little results. Patient states, prior to 2 months ago, her bowel routine was normal.       ROS:  General: Patient denies n/v/f/c or weight loss. HEENT: Patient denies persistent postnasal drip, scleral icterus, drooling, persistent bleeding from nose/mouth. Resp: Patient denies SOB, wheezing, productive cough. Cards: Patient denies CP, palpitations, significant edema  GI: As above. Derm: Patient denies jaundice/rashes. Musc: Patient denies diffuse/irregular joint swelling or myalgias. Objective   Wt Readings from Last 3 Encounters:   22 127 lb (57.6 kg)   22 131 lb (59.4 kg)   21 132 lb (59.9 kg)     Temp Readings from Last 3 Encounters:   22 97.2 °F (36.2 °C)   21 97 °F (36.1 °C) (Temporal)   21 98.6 °F (37 °C)     BP Readings from Last 3 Encounters:   22 115/72   22 120/74   21 122/80     Pulse Readings from Last 3 Encounters:   22 69   22 64   21 61        Physical Exam  Cardiovascular:      Heart sounds: Normal heart sounds. Pulmonary:      Breath sounds: Normal breath sounds. Abdominal:      General: Bowel sounds are normal.      Palpations: Abdomen is soft.          Past Medical History:   Diagnosis Date    Hx of non anemic vitamin B12 deficiency 10/6/2020    Neuropathy 10/6/2020    Right lumbosacral radiculopathy 10/9/2020      Past Surgical History:   Procedure Laterality Date    ANKLE SURGERY Right 1/21/2021    REPAIR RIGHT EXTENSOR DIGITORUM LONGUS TENDON WITH GRAFT (TENDON GRAFT-ARTHREX) (CPT 18370) performed by Morena Dunn DPM at [address] Right 01/21/2021    REPAIR EXTENSOR DIGITORUMLONGUE WITH GRANFT RIGHT    TURBINATE RESECTION  08/17/2017    WISDOM TOOTH EXTRACTION        Family History   Problem Relation Age of Onset    Other Mother         ALS     Prostate Cancer Father         Lab Results   Component Value Date    WBC 5.1 01/24/2022    HGB 13.7 01/24/2022    HCT 42.6 01/24/2022    MCV 94.2 01/24/2022     01/24/2022      Lab Results   Component Value Date     01/24/2022    K 4.2 01/24/2022     01/24/2022    CO2 19 (L) 01/24/2022    BUN 14 01/24/2022    CREATININE 1.1 (H) 01/24/2022    GLUCOSE 93 01/24/2022    CALCIUM 9.8 01/24/2022    PROT 7.0 01/24/2022    LABALBU 4.3 01/24/2022    BILITOT 0.2 01/24/2022    ALKPHOS 37 01/24/2022    AST 28 01/24/2022    ALT 18 01/24/2022    LABGLOM 55 01/24/2022    GFRAA >60 01/24/2022                       ASSESSMENT/PLAN:    1. Constipation, unspecified constipation type  -     Tissue Transglutaminase, IgA; Future  -     IgA; Future  -     C-Reactive Protein; Future  -     Sedimentation Rate; Future  -     XR ABDOMEN (KUB) (SINGLE AP VIEW); Future  -Labs ordered to rule out celiac sprue and IBD  -Abdominal xray ordered to determine extent of constipation. Once the xray is reviewed, I will provide patient with recommendations.   -Due to abrupt presentation of constipation and bloating, I will advise a colonoscopy. Patient is agreeable to proceed. Schedule colonoscopy under MAC anesthesia. Literature given. Periprocedural hydration advised. The risks and alternatives were explained as per the ASGE guidelines (I.  E.  Perforation, 3% chance of bleeding, reaction to medication, missed colon lesions,  infections, and death).     Return for Follow up with Dr. Chuck Desai post

## 2022-04-22 ENCOUNTER — TELEPHONE (OUTPATIENT)
Dept: GASTROENTEROLOGY | Age: 42
End: 2022-04-22

## 2022-04-22 LAB — IGA: 125 MG/DL (ref 70–400)

## 2022-04-22 NOTE — TELEPHONE ENCOUNTER
Prior Authorization Form:      DEMOGRAPHICS:                     Patient Name:  Mihir Cody  Patient :  1980            Insurance:  Payor: Jeffrey Aguilar / Plan: Jeffrey Aguilar - [place] PPO / Product Type: *No Product type* /   Insurance ID Number:    Payor/Plan Subscr  Sex Relation Sub. Ins. ID Effective Group Num   1.  [id number] Ruba Scherer -* VALDEZ CODY 1980 Female Self [id number] 1/1/15 [id number]                                    [id number]         DIAGNOSIS & PROCEDURE:                       Procedure/Operation: Colonoscopy           CPT Code: 51944    Diagnosis:  Constipation    ICD10 Code: K59.00    Location:  [facility]    Surgeon:  Dr. Robbins [facility] INFORMATION:                          Date: 2022    Time:               Anesthesia:  MAC/TIVA                                                       Status:  Outpatient          Electronically signed by Freida Roche MA on 2022 at 3:38 PM

## 2022-04-22 NOTE — TELEPHONE ENCOUNTER
Called and spoke to patient in detail about upcoming procedure on 09/30/22 @ [facility] (1-). Informed the patient that the hospital will call a couple days prior to the procedure to go over more detailed instructions. Pt verbalized understanding.

## 2022-04-28 LAB — TISSUE TRANSGLUTAMINASE IGA: 0.5 U/ML

## 2022-10-03 ENCOUNTER — TELEPHONE (OUTPATIENT)
Dept: GASTROENTEROLOGY | Age: 42
End: 2022-10-03

## 2022-10-20 ENCOUNTER — PREP FOR PROCEDURE (OUTPATIENT)
Dept: GASTROENTEROLOGY | Age: 42
End: 2022-10-20

## 2022-10-20 ENCOUNTER — TELEPHONE (OUTPATIENT)
Dept: GASTROENTEROLOGY | Age: 42
End: 2022-10-20

## 2022-10-20 PROBLEM — R10.9 ABDOMINAL PAIN: Status: ACTIVE | Noted: 2022-09-22

## 2022-10-20 NOTE — TELEPHONE ENCOUNTER
Prior Authorization Form:      DEMOGRAPHICS:                     Patient Name:  Sree Cody  Patient :  1980            Insurance:  Payor: Braulio Ward [employer] / Plan: Braulio Ward [employer] - [place] PPO / Product Type: *No Product type* /   Insurance ID Number:    Payer/Plan Subscr  Sex Relation Sub. Ins. ID Effective Group Num   1.  [id number] Ruba Scherer -* VALDEZ CODY 1980 Female Self [id number] 1/1/15 [id number]                                    [id number]         DIAGNOSIS & PROCEDURE:                       Procedure/Operation: EGD           CPT Code: 21276    Diagnosis:  Abdominal Pain    ICD10 Code: R10.9    Location:  [facility], [place]    Surgeon:  Dr. Elvin Lanza     SCHEDULING INFORMATION:                          Date: 2022    Time: 1100              Anesthesia:  MAC/TIVA                                                       Status:  Outpatient            Electronically signed by Brie Ku on 10/20/2022 at 2:53 PM

## 2022-11-29 ENCOUNTER — OFFICE VISIT (OUTPATIENT)
Dept: PRIMARY CARE CLINIC | Age: 42
End: 2022-11-29
Payer: COMMERCIAL

## 2022-11-29 VITALS
OXYGEN SATURATION: 99 % | BODY MASS INDEX: 23.39 KG/M2 | RESPIRATION RATE: 18 BRPM | HEIGHT: 63 IN | SYSTOLIC BLOOD PRESSURE: 120 MMHG | WEIGHT: 132 LBS | HEART RATE: 81 BPM | DIASTOLIC BLOOD PRESSURE: 64 MMHG | TEMPERATURE: 97.9 F

## 2022-11-29 DIAGNOSIS — E53.8 B12 DEFICIENCY: ICD-10-CM

## 2022-11-29 DIAGNOSIS — R30.9 URINARY PAIN: Primary | ICD-10-CM

## 2022-11-29 DIAGNOSIS — M54.50 ACUTE RIGHT-SIDED LOW BACK PAIN WITHOUT SCIATICA: ICD-10-CM

## 2022-11-29 DIAGNOSIS — F51.01 PRIMARY INSOMNIA: ICD-10-CM

## 2022-11-29 LAB
BILIRUBIN, POC: NORMAL
BLOOD URINE, POC: NEGATIVE
CLARITY, POC: CLEAR
COLOR, POC: NORMAL
GLUCOSE URINE, POC: NEGATIVE
KETONES, POC: NORMAL
LEUKOCYTE EST, POC: NEGATIVE
NITRITE, POC: NEGATIVE
PH, POC: 5.5
PROTEIN, POC: NORMAL
SPECIFIC GRAVITY, POC: 1.03
UROBILINOGEN, POC: 0.2

## 2022-11-29 PROCEDURE — 96372 THER/PROPH/DIAG INJ SC/IM: CPT | Performed by: FAMILY MEDICINE

## 2022-11-29 PROCEDURE — 99214 OFFICE O/P EST MOD 30 MIN: CPT | Performed by: FAMILY MEDICINE

## 2022-11-29 PROCEDURE — 81002 URINALYSIS NONAUTO W/O SCOPE: CPT | Performed by: FAMILY MEDICINE

## 2022-11-29 RX ORDER — TRAZODONE HYDROCHLORIDE 50 MG/1
50 TABLET ORAL NIGHTLY
Qty: 90 TABLET | Refills: 1 | Status: SHIPPED | OUTPATIENT
Start: 2022-11-29

## 2022-11-29 RX ORDER — CYANOCOBALAMIN 1000 UG/ML
1000 INJECTION, SOLUTION INTRAMUSCULAR; SUBCUTANEOUS ONCE
Status: COMPLETED | OUTPATIENT
Start: 2022-11-29 | End: 2022-11-29

## 2022-11-29 RX ADMIN — CYANOCOBALAMIN 1000 MCG: 1000 INJECTION, SOLUTION INTRAMUSCULAR; SUBCUTANEOUS at 14:12

## 2022-11-29 ASSESSMENT — ENCOUNTER SYMPTOMS
VOMITING: 0
BLOOD IN STOOL: 0
FACIAL SWELLING: 0
ALLERGIC/IMMUNOLOGIC NEGATIVE: 1
WHEEZING: 0
CHEST TIGHTNESS: 0
COUGH: 0
SORE THROAT: 0
APNEA: 0
SHORTNESS OF BREATH: 0
NAUSEA: 0
COLOR CHANGE: 0
ABDOMINAL PAIN: 0
PHOTOPHOBIA: 0
DIARRHEA: 0
SINUS PRESSURE: 0
BACK PAIN: 1

## 2022-11-29 NOTE — PROGRESS NOTES
Chief Complaint:   Chief Complaint   Patient presents with    Back Pain    Urinary Pain       Back Pain  This is a new problem. The current episode started in the past 7 days. The problem occurs constantly. The pain is present in the lumbar spine. The quality of the pain is described as aching. The pain does not radiate. The pain is at a severity of 3/10. The pain is mild. Pertinent negatives include no abdominal pain, chest pain, headaches or weakness. Urinary Pain   This is a new problem. The current episode started in the past 7 days. The problem occurs intermittently. The problem has been resolved. Pertinent negatives include no frequency, nausea, urgency or vomiting. She has tried antibiotics for the symptoms. The treatment provided significant relief. Insomnia  This is a new problem. The current episode started 1 to 4 weeks ago. The problem occurs daily. The problem has been gradually worsening. Pertinent negatives include no abdominal pain, arthralgias, chest pain, congestion, coughing, headaches, joint swelling, myalgias, nausea, rash, sore throat, vomiting or weakness. Treatments tried: melatonin. The treatment provided no relief.      Patient Active Problem List   Diagnosis    Neuropathy    Hx of non anemic vitamin B12 deficiency    Right lumbosacral radiculopathy    Localized edema    Difficulty walking    Tear of tendon of right ankle    Tendonitis of ankle, right    Right foot pain    Abdominal pain       Past Medical History:   Diagnosis Date    Hx of non anemic vitamin B12 deficiency 10/6/2020    Neuropathy 10/6/2020    Right lumbosacral radiculopathy 10/9/2020       Past Surgical History:   Procedure Laterality Date    ANKLE SURGERY Right 1/21/2021    REPAIR RIGHT EXTENSOR DIGITORUM LONGUS TENDON WITH GRAFT (TENDON GRAFT-ARTHREX) (CPT 08280) performed by Jose Tian DPM at [address] Right 01/21/2021    REPAIR EXTENSOR DIGITORUMLONGUE WITH GRANFT RIGHT    TURBINATE RESECTION  08/17/2017    WISDOM TOOTH EXTRACTION         Current Outpatient Medications   Medication Sig Dispense Refill    traZODone (DESYREL) 50 MG tablet Take 1 tablet by mouth nightly 90 tablet 1    linaclotide (LINZESS) 145 MCG capsule Take 1 capsule by mouth every morning (before breakfast) 30 capsule 3    norethindrone-ethinyl estradiol (BALZIVA) 0.4-35 MG-MCG per tablet Take by mouth See Admin Instructions       Current Facility-Administered Medications   Medication Dose Route Frequency Provider Last Rate Last Admin    cyanocobalamin injection 1,000 mcg  1,000 mcg IntraMUSCular Once Tom Deidre, DO           No Known Allergies    Social History     Socioeconomic History    Marital status: Single     Spouse name: None    Number of children: None    Years of education: None    Highest education level: None   Tobacco Use    Smoking status: Never    Smokeless tobacco: Never   Vaping Use    Vaping Use: Never used   Substance and Sexual Activity    Alcohol use: Yes     Alcohol/week: 2.0 standard drinks     Types: 2 Cans of beer per week     Comment: 2-3 beers 2-3 days a week     Drug use: Never       Family History   Problem Relation Age of Onset    Other Mother         ALS     Prostate Cancer Father          Review of Systems   Constitutional: Negative. HENT:  Negative for congestion, facial swelling, hearing loss, nosebleeds, sinus pressure and sore throat. Eyes:  Negative for photophobia and visual disturbance. Respiratory:  Negative for apnea, cough, chest tightness, shortness of breath and wheezing. Cardiovascular:  Negative for chest pain, palpitations and leg swelling. Gastrointestinal:  Negative for abdominal pain, blood in stool, diarrhea, nausea and vomiting. Genitourinary:  Negative for difficulty urinating, frequency and urgency. Musculoskeletal:  Positive for back pain. Negative for arthralgias, joint swelling and myalgias. Skin:  Negative for color change and rash. Allergic/Immunologic: Negative. Neurological:  Negative for syncope, weakness, light-headedness and headaches. Hematological: Negative. Psychiatric/Behavioral:  Negative for agitation, behavioral problems, confusion and self-injury. The patient is nervous/anxious and has insomnia. All other systems reviewed and are negative. Physical Exam  Vitals and nursing note reviewed. Constitutional:       General: She is not in acute distress. Appearance: She is well-developed. HENT:      Head: Normocephalic and atraumatic. Nose: Nose normal.   Eyes:      Conjunctiva/sclera: Conjunctivae normal.      Pupils: Pupils are equal, round, and reactive to light. Neck:      Thyroid: No thyromegaly. Vascular: No JVD. Cardiovascular:      Rate and Rhythm: Normal rate and regular rhythm. Heart sounds: Normal heart sounds. No murmur heard. No friction rub. No gallop. Pulmonary:      Effort: Pulmonary effort is normal. No respiratory distress. Breath sounds: Normal breath sounds. No wheezing. Abdominal:      General: Bowel sounds are normal. There is no distension. Palpations: Abdomen is soft. Tenderness: There is no abdominal tenderness. There is no guarding or rebound. Musculoskeletal:         General: Normal range of motion. Cervical back: Normal range of motion and neck supple. Lumbar back: Tenderness present. Lymphadenopathy:      Cervical: No cervical adenopathy. Skin:     General: Skin is warm and dry. Findings: No erythema or rash. Neurological:      Mental Status: She is alert and oriented to person, place, and time. Cranial Nerves: No cranial nerve deficit. Motor: No abnormal muscle tone. Coordination: Coordination normal.      Deep Tendon Reflexes: Reflexes are normal and symmetric. Psychiatric:         Mood and Affect: Mood is anxious.          Behavior: Behavior normal.         Judgment: Judgment normal. ASSESSMENT/PLAN:    See Durand was seen today for back pain and urinary pain. Diagnoses and all orders for this visit:    Urinary pain  -     POCT Urinalysis no Micro    Acute right-sided low back pain without sciatica    B12 deficiency  -     cyanocobalamin injection 1,000 mcg    Primary insomnia  -     traZODone (DESYREL) 50 MG tablet;  Take 1 tablet by mouth nightly      Aleve otc prn back pain    Boneta Never, DO    11/29/2022  2:11 PM

## 2023-01-05 ENCOUNTER — PATIENT MESSAGE (OUTPATIENT)
Dept: PRIMARY CARE CLINIC | Age: 43
End: 2023-01-05

## 2023-01-05 DIAGNOSIS — G62.9 NEUROPATHY: ICD-10-CM

## 2023-01-05 DIAGNOSIS — E53.8 B12 DEFICIENCY: ICD-10-CM

## 2023-01-05 DIAGNOSIS — E78.5 HYPERLIPIDEMIA, UNSPECIFIED HYPERLIPIDEMIA TYPE: Primary | ICD-10-CM

## 2023-01-05 NOTE — TELEPHONE ENCOUNTER
From: Alanna Cody  To: Dr. Claudia Soto  Sent: 1/5/2023 11:47 AM EST  Subject: Bloodwork    Should I get my yearly bloodwork done prior to my appointment next week?

## 2023-01-16 NOTE — PROGRESS NOTES
[address] [facility]                                                                                                                    PRE OP INSTRUCTIONS FOR  Stephie Smoker        Date: 1/16/2023    Date of surgery: 1/17/2023    Arrival Time: Hospital will call you between 5pm and 7pm with your final arrival time for surgery    Nothing by mouth (NPO) as instructed.____midnight ________________________________________________________________    Take the following medications with a small sip of water on the morning of Surgery:      Diabetics may take evening dose of insulin but none after midnight. If you feel symptomatic or low blood sugar morning of surgery drink 1-2 ounces of apple juice only. Aspirin, Ibuprofen, Advil, Naproxen, Vitamin E and other Anti-inflammatory products should be stopped  before surgery  as directed by your physician. Take Tylenol only unless instructed otherwise by your surgeon. Check with your Doctor regarding stopping Plavix, Coumadin, Lovenox, Eliquis, Effient, or other blood thinners. Do not smoke,use illicit drugs and do not drink any alcoholic beverages 24 hours prior to surgery. You may brush your teeth the morning of surgery. DO NOT SWALLOW WATER    You MUST make arrangements for a responsible adult to take you home after your surgery. You will not be allowed to leave alone or drive yourself home. It is strongly suggested someone stay with you the first 24 hrs. Your surgery will be cancelled if you do not have a ride home. PEDIATRIC PATIENTS ONLY:  A parent/legal guardian must accompany a child scheduled for surgery and plan to stay at the hospital until the child is discharged. Please do not bring other children with you. Please wear simple, loose fitting clothing to the hospital.  Kristina Alonzoer not bring valuables (money, credit cards, checkbooks, etc.) Do not wear any makeup (including no eye makeup) or nail polish on your fingers or toes.     DO NOT wear any jewelry or piercings on day of surgery. All body piercing jewelry must be removed. Shower the night before surgery with _x__Antibacterial soap /JOCELINE WIPES________    TOTAL JOINT REPLACEMENT/HYSTERECTOMY PATIENTS ONLY---Remember to bring Blood Bank bracelet to the hospital on the day of surgery. If you have a Living Will and Durable Power of  for Healthcare, please bring in a copy. If appropriate bring crutches, inspirex, WALKER, CANE etc... Notify your Surgeon if you develop any illness between now and surgery time, cough, cold, fever, sore throat, nausea, vomiting, etc.  Please notify your surgeon if you experience dizziness, shortness of breath or blurred vision between now & the time of your surgery. If you have ___dentures, they will be removed before going to the OR; we will provide you a container. If you wear ___contact lenses or ___glasses, they will be removed; please bring a case for them. To provide excellent care visitors will be limited to 2 in the room at any given time. Please bring picture ID and insurance card. During flu season no children under the age of 15 are permitted in the hospital for the safety of all patients. Other                  Please call AMBULATORY CARE if you have any further questions.    [address] [facility]     [address]

## 2023-01-17 ENCOUNTER — HOSPITAL ENCOUNTER (OUTPATIENT)
Age: 43
Setting detail: OUTPATIENT SURGERY
Discharge: HOME | End: 2023-01-17
Attending: STUDENT IN AN ORGANIZED HEALTH CARE EDUCATION/TRAINING PROGRAM | Admitting: STUDENT IN AN ORGANIZED HEALTH CARE EDUCATION/TRAINING PROGRAM
Payer: COMMERCIAL

## 2023-01-17 ENCOUNTER — ANESTHESIA (OUTPATIENT)
Dept: ENDOSCOPY | Age: 43
End: 2023-01-17
Payer: COMMERCIAL

## 2023-01-17 ENCOUNTER — ANESTHESIA EVENT (OUTPATIENT)
Dept: ENDOSCOPY | Age: 43
End: 2023-01-17
Payer: COMMERCIAL

## 2023-01-17 VITALS
HEART RATE: 62 BPM | SYSTOLIC BLOOD PRESSURE: 119 MMHG | HEIGHT: 63 IN | TEMPERATURE: 97 F | RESPIRATION RATE: 18 BRPM | DIASTOLIC BLOOD PRESSURE: 58 MMHG | WEIGHT: 121 LBS | BODY MASS INDEX: 21.44 KG/M2 | OXYGEN SATURATION: 100 %

## 2023-01-17 LAB
HCG, URINE, POC: NEGATIVE
Lab: NORMAL
NEGATIVE QC PASS/FAIL: NORMAL
POSITIVE QC PASS/FAIL: NORMAL

## 2023-01-17 PROCEDURE — 3609027000 HC COLONOSCOPY: Performed by: STUDENT IN AN ORGANIZED HEALTH CARE EDUCATION/TRAINING PROGRAM

## 2023-01-17 PROCEDURE — 6360000002 HC RX W HCPCS: Performed by: NURSE ANESTHETIST, CERTIFIED REGISTERED

## 2023-01-17 PROCEDURE — 2709999900 HC NON-CHARGEABLE SUPPLY: Performed by: STUDENT IN AN ORGANIZED HEALTH CARE EDUCATION/TRAINING PROGRAM

## 2023-01-17 PROCEDURE — 7100000010 HC PHASE II RECOVERY - FIRST 15 MIN: Performed by: STUDENT IN AN ORGANIZED HEALTH CARE EDUCATION/TRAINING PROGRAM

## 2023-01-17 PROCEDURE — 2580000003 HC RX 258: Performed by: NURSE ANESTHETIST, CERTIFIED REGISTERED

## 2023-01-17 PROCEDURE — 3700000001 HC ADD 15 MINUTES (ANESTHESIA): Performed by: STUDENT IN AN ORGANIZED HEALTH CARE EDUCATION/TRAINING PROGRAM

## 2023-01-17 PROCEDURE — 3700000000 HC ANESTHESIA ATTENDED CARE: Performed by: STUDENT IN AN ORGANIZED HEALTH CARE EDUCATION/TRAINING PROGRAM

## 2023-01-17 PROCEDURE — 7100000011 HC PHASE II RECOVERY - ADDTL 15 MIN: Performed by: STUDENT IN AN ORGANIZED HEALTH CARE EDUCATION/TRAINING PROGRAM

## 2023-01-17 PROCEDURE — 45378 DIAGNOSTIC COLONOSCOPY: CPT | Performed by: STUDENT IN AN ORGANIZED HEALTH CARE EDUCATION/TRAINING PROGRAM

## 2023-01-17 RX ORDER — SODIUM CHLORIDE 0.9 % (FLUSH) 0.9 %
5-40 SYRINGE (ML) INJECTION PRN
Status: DISCONTINUED | OUTPATIENT
Start: 2023-01-17 | End: 2023-01-17 | Stop reason: HOSPADM

## 2023-01-17 RX ORDER — SODIUM CHLORIDE 9 MG/ML
INJECTION, SOLUTION INTRAVENOUS PRN
Status: CANCELLED | OUTPATIENT
Start: 2023-01-17

## 2023-01-17 RX ORDER — ONDANSETRON 2 MG/ML
4 INJECTION INTRAMUSCULAR; INTRAVENOUS EVERY 6 HOURS PRN
Status: CANCELLED | OUTPATIENT
Start: 2023-01-17

## 2023-01-17 RX ORDER — SODIUM CHLORIDE 9 MG/ML
25 INJECTION, SOLUTION INTRAVENOUS PRN
Status: DISCONTINUED | OUTPATIENT
Start: 2023-01-17 | End: 2023-01-17 | Stop reason: HOSPADM

## 2023-01-17 RX ORDER — SODIUM CHLORIDE, SODIUM LACTATE, POTASSIUM CHLORIDE, CALCIUM CHLORIDE 600; 310; 30; 20 MG/100ML; MG/100ML; MG/100ML; MG/100ML
INJECTION, SOLUTION INTRAVENOUS CONTINUOUS PRN
Status: DISCONTINUED | OUTPATIENT
Start: 2023-01-17 | End: 2023-01-17 | Stop reason: SDUPTHER

## 2023-01-17 RX ORDER — FENTANYL CITRATE 50 UG/ML
INJECTION, SOLUTION INTRAMUSCULAR; INTRAVENOUS PRN
Status: DISCONTINUED | OUTPATIENT
Start: 2023-01-17 | End: 2023-01-17 | Stop reason: SDUPTHER

## 2023-01-17 RX ORDER — SODIUM CHLORIDE 0.9 % (FLUSH) 0.9 %
5-40 SYRINGE (ML) INJECTION PRN
Status: CANCELLED | OUTPATIENT
Start: 2023-01-17

## 2023-01-17 RX ORDER — SENNA PLUS 8.6 MG/1
2 TABLET ORAL EVERY EVENING
Qty: 60 TABLET | Refills: 11 | Status: SHIPPED | OUTPATIENT
Start: 2023-01-17 | End: 2024-01-17

## 2023-01-17 RX ORDER — SODIUM CHLORIDE 0.9 % (FLUSH) 0.9 %
5-40 SYRINGE (ML) INJECTION EVERY 12 HOURS SCHEDULED
Status: DISCONTINUED | OUTPATIENT
Start: 2023-01-17 | End: 2023-01-17 | Stop reason: HOSPADM

## 2023-01-17 RX ORDER — SODIUM CHLORIDE, SODIUM LACTATE, POTASSIUM CHLORIDE, CALCIUM CHLORIDE 600; 310; 30; 20 MG/100ML; MG/100ML; MG/100ML; MG/100ML
INJECTION, SOLUTION INTRAVENOUS CONTINUOUS
Status: DISCONTINUED | OUTPATIENT
Start: 2023-01-17 | End: 2023-01-17 | Stop reason: HOSPADM

## 2023-01-17 RX ORDER — SODIUM CHLORIDE 0.9 % (FLUSH) 0.9 %
5-40 SYRINGE (ML) INJECTION EVERY 12 HOURS SCHEDULED
Status: CANCELLED | OUTPATIENT
Start: 2023-01-17

## 2023-01-17 RX ORDER — ONDANSETRON 4 MG/1
4 TABLET, ORALLY DISINTEGRATING ORAL EVERY 8 HOURS PRN
Status: CANCELLED | OUTPATIENT
Start: 2023-01-17

## 2023-01-17 RX ORDER — PROPOFOL 10 MG/ML
INJECTION, EMULSION INTRAVENOUS PRN
Status: DISCONTINUED | OUTPATIENT
Start: 2023-01-17 | End: 2023-01-17 | Stop reason: SDUPTHER

## 2023-01-17 RX ORDER — POLYETHYLENE GLYCOL 3350 17 G/17G
17 POWDER, FOR SOLUTION ORAL DAILY
Qty: 1530 G | Refills: 5 | Status: SHIPPED | OUTPATIENT
Start: 2023-01-17 | End: 2023-02-16

## 2023-01-17 RX ADMIN — PROPOFOL 25 MG: 10 INJECTION, EMULSION INTRAVENOUS at 12:05

## 2023-01-17 RX ADMIN — PROPOFOL 100 MG: 10 INJECTION, EMULSION INTRAVENOUS at 11:56

## 2023-01-17 RX ADMIN — PROPOFOL 25 MG: 10 INJECTION, EMULSION INTRAVENOUS at 12:00

## 2023-01-17 RX ADMIN — SODIUM CHLORIDE, POTASSIUM CHLORIDE, SODIUM LACTATE AND CALCIUM CHLORIDE: 600; 310; 30; 20 INJECTION, SOLUTION INTRAVENOUS at 11:53

## 2023-01-17 RX ADMIN — PROPOFOL 25 MG: 10 INJECTION, EMULSION INTRAVENOUS at 12:02

## 2023-01-17 RX ADMIN — PROPOFOL 25 MG: 10 INJECTION, EMULSION INTRAVENOUS at 12:19

## 2023-01-17 RX ADMIN — PROPOFOL 50 MG: 10 INJECTION, EMULSION INTRAVENOUS at 11:57

## 2023-01-17 RX ADMIN — PROPOFOL 25 MG: 10 INJECTION, EMULSION INTRAVENOUS at 12:12

## 2023-01-17 RX ADMIN — FENTANYL CITRATE 100 MCG: 50 INJECTION, SOLUTION INTRAMUSCULAR; INTRAVENOUS at 11:56

## 2023-01-17 ASSESSMENT — PAIN - FUNCTIONAL ASSESSMENT: PAIN_FUNCTIONAL_ASSESSMENT: NONE - DENIES PAIN

## 2023-01-17 ASSESSMENT — LIFESTYLE VARIABLES: SMOKING_STATUS: 0

## 2023-01-17 NOTE — DISCHARGE INSTRUCTIONS
Recommendations:  -The patient will be observed post procedure until all discharge criteria are met. -Patient has a contact number available for emergencies. The signs and symptoms of potential delayed complications were discussed with the patient.    -Return to normal activities tomorrow. -Written discharge instructions were provided to the patient.    -Clinic appointment scheduled 1/31/23.

## 2023-01-17 NOTE — ANESTHESIA PRE PROCEDURE
Department of Anesthesiology  Preprocedure Note       Name:  Yonis Murray   Age:  38 y.o.  :  1980                                          MRN:  [id number]         Date:  2023      Surgeon: Mary Ryan):  Mago Yancey MD    Procedure: Procedure(s):  COLONOSCOPY    Medications prior to admission:   Prior to Admission medications    Medication Sig Start Date End Date Taking? Authorizing Provider   traZODone (DESYREL) 50 MG tablet Take 1 tablet by mouth nightly 22   Malina Garduno DO   linaclotide [facility]) 145 MCG capsule Take 1 capsule by mouth every morning (before breakfast)  Patient taking differently: Take 145 mcg by mouth every morning (before breakfast) Not taking several months 22   CORTEZ Hilliard - SUBHA   norethindrone-ethinyl estradiol Nicholasisaac Hopkins) 0.4-35 MG-MCG per tablet Take by mouth See Admin Instructions    Historical Provider, MD       Current medications:    No current facility-administered medications for this visit. No current outpatient medications on file.      Facility-Administered Medications Ordered in Other Visits   Medication Dose Route Frequency Provider Last Rate Last Admin    lactated ringers infusion   IntraVENous Continuous Dirk Jessica,         sodium chloride flush 0.9 % injection 5-40 mL  5-40 mL IntraVENous PRN Paula Melo MD        sodium chloride flush 0.9 % injection 5-40 mL  5-40 mL IntraVENous 2 times per day Mago Yancey MD        sodium chloride flush 0.9 % injection 5-40 mL  5-40 mL IntraVENous PRN Mago Yancey MD        0.9 % sodium chloride infusion  25 mL IntraVENous PRN Mago Yancey MD           Allergies:  No Known Allergies    Problem List:    Patient Active Problem List   Diagnosis Code    Neuropathy G62.9    Hx of non anemic vitamin B12 deficiency Z86.39    Right lumbosacral radiculopathy M54.17    Localized edema R60.0    Difficulty walking R26.2    Tear of tendon of right ankle S96.911A    Tendonitis of ankle, right M77.51    Right foot pain M79.671    Abdominal pain R10.9       Past Medical History:        Diagnosis Date    Hx of non anemic vitamin B12 deficiency 10/6/2020    Neuropathy 10/6/2020    Right lumbosacral radiculopathy 10/9/2020       Past Surgical History:        Procedure Laterality Date    ANKLE SURGERY Right 1/21/2021    REPAIR RIGHT EXTENSOR DIGITORUM LONGUS TENDON WITH GRAFT (TENDON GRAFT-ARTHREX) (CPT 36387) performed by Mathieu Mccullough DPM at [address] [facility] Right 01/21/2021    REPAIR EXTENSOR [address] [facility] RIGHT    TURBINATE RESECTION  08/17/2017    WISDOM TOOTH EXTRACTION         Social History:    Social History     Tobacco Use    Smoking status: Never    Smokeless tobacco: Never   Substance Use Topics    Alcohol use: Yes     Alcohol/week: 2.0 standard drinks     Types: 2 Cans of beer per week     Comment: 2-3 beers 2-3 days a week                                 Counseling given: Not Answered      Vital Signs (Current): There were no vitals filed for this visit.                                            BP Readings from Last 3 Encounters:   01/17/23 (!) 127/90   11/29/22 120/64   04/21/22 115/72       NPO Status:                                                                                 BMI:   Wt Readings from Last 3 Encounters:   01/17/23 121 lb (54.9 kg)   11/29/22 132 lb (59.9 kg)   04/21/22 127 lb (57.6 kg)     There is no height or weight on file to calculate BMI.    CBC:   Lab Results   Component Value Date/Time    WBC 5.1 01/24/2022 10:11 AM    RBC 4.52 01/24/2022 10:11 AM    HGB 13.7 01/24/2022 10:11 AM    HCT 42.6 01/24/2022 10:11 AM    MCV 94.2 01/24/2022 10:11 AM    RDW 12.8 01/24/2022 10:11 AM     01/24/2022 10:11 AM       CMP:   Lab Results   Component Value Date/Time     01/24/2022 10:11 AM    K 4.2 01/24/2022 10:11 AM     01/24/2022 10:11 AM    CO2 19 01/24/2022 10:11 AM    BUN 14 01/24/2022 10:11 AM    CREATININE 1.1 01/24/2022 10:11 AM    GFRAA >60 01/24/2022 10:11 AM    LABGLOM 55 01/24/2022 10:11 AM    GLUCOSE 93 01/24/2022 10:11 AM    PROT 7.0 01/24/2022 10:11 AM    CALCIUM 9.8 01/24/2022 10:11 AM    BILITOT 0.2 01/24/2022 10:11 AM    ALKPHOS 37 01/24/2022 10:11 AM    AST 28 01/24/2022 10:11 AM    ALT 18 01/24/2022 10:11 AM       POC Tests: No results for input(s): POCGLU, POCNA, POCK, POCCL, POCBUN, POCHEMO, POCHCT in the last 72 hours. Coags: No results found for: PROTIME, INR, APTT    HCG (If Applicable):   Lab Results   Component Value Date    PREGTESTUR NEGATIVE 08/17/2017        ABGs: No results found for: PHART, PO2ART, SNK4OHI, BXE4QLF, BEART, O2NQGIJU     Type & Screen (If Applicable):  No results found for: LABABO, LABRH    Drug/Infectious Status (If Applicable):  No results found for: HIV, HEPCAB    COVID-19 Screening (If Applicable):   Lab Results   Component Value Date/Time    COVID19 Detected 12/29/2021 05:02 PM    COVID19 Not Detected 01/14/2021 02:00 PM         Anesthesia Evaluation  Patient summary reviewed no history of anesthetic complications:   Airway: Mallampati: II  TM distance: >3 FB   Neck ROM: full  Mouth opening: > = 3 FB   Dental: normal exam         Pulmonary: breath sounds clear to auscultation      (-) not a current smoker                           Cardiovascular:            Rhythm: regular  Rate: normal                    Neuro/Psych:   (+) neuromuscular disease ( Right lumbosacral radiculopathy):,             GI/Hepatic/Renal:        (-) no morbid obesity       Endo/Other:                     Abdominal:         (-) obese Abdomen: soft. Vascular: Other Findings:             Anesthesia Plan      MAC     ASA 2     (HCG negative)  Induction: intravenous. Anesthetic plan and risks discussed with patient. Plan discussed with CRNA.             PAT Chart Review:  Chart reviewed per routine by Kylee De La Torre MD.  Above represents information available via shared medical record including previous anesthesia history, drug and allergy history. Confirmation of above and final plan per Day of Surgery (DOS) anesthesiologist.        CORTEZ Mejia - CRNA   1/17/2023        Pt seen questions answered plan outlined H&P reviewed pt examined accepts no interim changes.  Alessandra Fisher M.d 01/21/2021.0725

## 2023-01-17 NOTE — H&P
[facility] ([facility])   Gastroenterology, Hepatology, &  Advanced Endoscopy    Consult       ASSESSMENT AND PLAN:    42y/F w/ abdominal pain and irregular bowel habits who presents for endoscopic evaluation. PLAN:  Colonoscopy today        HISTORY OF PRESENT ILLNESS:      SUBJECTIVE/OBJECTIVE:    Ms. Yelitza Elizondo is a 42y/F that presents with complaints of bloating and constipation. Had an ultrasound by OB noted stool retention. Never feels fully evacuated. Has taken Metamucil for years. Denies abdominal pain or nausea. No rectal bleeding or weight loss. No family history of CRC or IBD. Has tried miralax with little results. Patient states, prior to 2 months ago, her bowel routine was normal.      Past Medical History:        Diagnosis Date    Hx of non anemic vitamin B12 deficiency 10/6/2020    Neuropathy 10/6/2020    Right lumbosacral radiculopathy 10/9/2020     Past Surgical History:        Procedure Laterality Date    ANKLE SURGERY Right 1/21/2021    REPAIR RIGHT EXTENSOR DIGITORUM LONGUS TENDON WITH GRAFT (TENDON GRAFT-ARTHREX) (CPT 59453) performed by Juan Mendoza DPM at [address] Right 01/21/2021    [facility] RIGHT    TURBINATE RESECTION  08/17/2017    WISDOM TOOTH EXTRACTION       Social History:    TOBACCO:   reports that she has never smoked. She has never used smokeless tobacco.  ETOH:   reports current alcohol use of about 2.0 standard drinks per week. DRUGS:   reports no history of drug use. Family History:       Problem Relation Age of Onset    Other Mother         ALS     Prostate Cancer Father        No current facility-administered medications for this encounter.     Current Outpatient Medications:     traZODone (DESYREL) 50 MG tablet, Take 1 tablet by mouth nightly, Disp: 90 tablet, Rfl: 1    linaclotide (LINZESS) 145 MCG capsule, Take 1 capsule by mouth every morning (before breakfast) (Patient taking differently: Take 145 mcg by mouth every morning (before breakfast) Not taking several months), Disp: 30 capsule, Rfl: 3    norethindrone-ethinyl estradiol (BALZIVA) 0.4-35 MG-MCG per tablet, Take by mouth See Admin Instructions, Disp: , Rfl:      Allergies:  Patient has no known allergies. ROS:  General: Patient denies n/v/f/c or weight loss. HEENT: Patient denies persistent postnasal drip, scleral icterus, drooling, persistent bleeding from nose/mouth. Resp: Patient denies SOB, wheezing, productive cough. Cards: Patient denies CP, palpitations, significant edema  GI: As above. Derm: Patient denies jaundice/rashes. Musc: Patient denies diffuse/irregular joint swelling or myalgias. PHYSICAL EXAM:  Ht 5' 3\" (1.6 m)   Wt 125 lb (56.7 kg)   LMP 01/09/2023   BMI 22.14 kg/m²   Physical Exam:  General: Overall well-appearing, NAD  HEENT: PERRLA, EOMI, Anicteric sclera, MMM, no rhinorrhea  Cards: RRR, no LE edema  Resp: Breathing comfortably on room air, good air movement, no use of accessory muscles, no audible wheezing  Abdomen: soft, NT, ND. Extremities: Moves all extremities, no effusions or bruising.   Skin: No rashes or jaundice  Neuro: A&O x 3, CN grossly intact, non-focal exam               Electronically signed by Margaret Triplett MD on 1/17/2023 at 6:46 AM

## 2023-01-17 NOTE — ANESTHESIA POSTPROCEDURE EVALUATION
Department of Anesthesiology  Postprocedure Note    Patient: Yarely Cherry  MRN: [id number]  YOB: 1980  Date of evaluation: 1/17/2023      Procedure Summary     Date: 01/17/23 Room / Location: [address] [place] [address] 01 / [facility]    Anesthesia Start: 6607 Anesthesia Stop: 1226    Procedure: COLONOSCOPY Diagnosis:       Constipation, unspecified constipation type      (Constipation, unspecified constipation type [K59.00])    Surgeons: Sabiha Oakley MD Responsible Provider: Demetria Mendoza MD    Anesthesia Type: MAC ASA Status: 2          Anesthesia Type: MAC    Holli Phase I: Holli Score: 10    Holli Phase II: Holli Score: 10      Anesthesia Post Evaluation    Patient location during evaluation: PACU  Patient participation: complete - patient participated  Level of consciousness: awake and alert  Airway patency: patent  Nausea & Vomiting: no nausea and no vomiting  Complications: no  Cardiovascular status: hemodynamically stable  Respiratory status: acceptable  Hydration status: euvolemic

## 2023-01-31 ENCOUNTER — OFFICE VISIT (OUTPATIENT)
Dept: GASTROENTEROLOGY | Age: 43
End: 2023-01-31
Payer: COMMERCIAL

## 2023-01-31 VITALS
BODY MASS INDEX: 21.62 KG/M2 | WEIGHT: 122 LBS | TEMPERATURE: 97 F | HEIGHT: 63 IN | HEART RATE: 58 BPM | RESPIRATION RATE: 16 BRPM | DIASTOLIC BLOOD PRESSURE: 62 MMHG | SYSTOLIC BLOOD PRESSURE: 102 MMHG | OXYGEN SATURATION: 99 %

## 2023-01-31 DIAGNOSIS — K59.00 CONSTIPATION, UNSPECIFIED CONSTIPATION TYPE: Primary | ICD-10-CM

## 2023-01-31 DIAGNOSIS — E53.8 B12 DEFICIENCY: ICD-10-CM

## 2023-01-31 DIAGNOSIS — G62.9 NEUROPATHY: ICD-10-CM

## 2023-01-31 DIAGNOSIS — E78.5 HYPERLIPIDEMIA, UNSPECIFIED HYPERLIPIDEMIA TYPE: ICD-10-CM

## 2023-01-31 LAB
ALBUMIN SERPL-MCNC: 4.2 G/DL (ref 3.5–5.2)
ALP BLD-CCNC: 52 U/L (ref 35–104)
ALT SERPL-CCNC: 28 U/L (ref 0–32)
ANION GAP SERPL CALCULATED.3IONS-SCNC: 15 MMOL/L (ref 7–16)
AST SERPL-CCNC: 25 U/L (ref 0–31)
BASOPHILS ABSOLUTE: 0.04 E9/L (ref 0–0.2)
BASOPHILS RELATIVE PERCENT: 0.5 % (ref 0–2)
BILIRUB SERPL-MCNC: 0.3 MG/DL (ref 0–1.2)
BUN BLDV-MCNC: 8 MG/DL (ref 6–20)
CALCIUM SERPL-MCNC: 9.5 MG/DL (ref 8.6–10.2)
CHLORIDE BLD-SCNC: 102 MMOL/L (ref 98–107)
CHOLESTEROL, TOTAL: 134 MG/DL (ref 0–199)
CO2: 22 MMOL/L (ref 22–29)
CREAT SERPL-MCNC: 1.1 MG/DL (ref 0.5–1)
EOSINOPHILS ABSOLUTE: 0.07 E9/L (ref 0.05–0.5)
EOSINOPHILS RELATIVE PERCENT: 0.9 % (ref 0–6)
FOLATE: 18.9 NG/ML (ref 4.8–24.2)
GFR SERPL CREATININE-BSD FRML MDRD: >60 ML/MIN/1.73
GLUCOSE BLD-MCNC: 87 MG/DL (ref 74–99)
HCT VFR BLD CALC: 40.2 % (ref 34–48)
HDLC SERPL-MCNC: 55 MG/DL
HEMOGLOBIN: 12.9 G/DL (ref 11.5–15.5)
IMMATURE GRANULOCYTES #: 0.02 E9/L
IMMATURE GRANULOCYTES %: 0.3 % (ref 0–5)
LDL CHOLESTEROL CALCULATED: 55 MG/DL (ref 0–99)
LYMPHOCYTES ABSOLUTE: 2.03 E9/L (ref 1.5–4)
LYMPHOCYTES RELATIVE PERCENT: 25.7 % (ref 20–42)
MCH RBC QN AUTO: 30.6 PG (ref 26–35)
MCHC RBC AUTO-ENTMCNC: 32.1 % (ref 32–34.5)
MCV RBC AUTO: 95.3 FL (ref 80–99.9)
MONOCYTES ABSOLUTE: 0.44 E9/L (ref 0.1–0.95)
MONOCYTES RELATIVE PERCENT: 5.6 % (ref 2–12)
NEUTROPHILS ABSOLUTE: 5.3 E9/L (ref 1.8–7.3)
NEUTROPHILS RELATIVE PERCENT: 67 % (ref 43–80)
PDW BLD-RTO: 12.7 FL (ref 11.5–15)
PLATELET # BLD: 269 E9/L (ref 130–450)
PMV BLD AUTO: 11.7 FL (ref 7–12)
POTASSIUM SERPL-SCNC: 4.2 MMOL/L (ref 3.5–5)
RBC # BLD: 4.22 E12/L (ref 3.5–5.5)
SODIUM BLD-SCNC: 139 MMOL/L (ref 132–146)
TOTAL PROTEIN: 7.1 G/DL (ref 6.4–8.3)
TRIGL SERPL-MCNC: 119 MG/DL (ref 0–149)
VITAMIN B-12: 491 PG/ML (ref 211–946)
VITAMIN D 25-HYDROXY: 68 NG/ML (ref 30–100)
VLDLC SERPL CALC-MCNC: 24 MG/DL
WBC # BLD: 7.9 E9/L (ref 4.5–11.5)

## 2023-01-31 PROCEDURE — 99212 OFFICE O/P EST SF 10 MIN: CPT | Performed by: NURSE PRACTITIONER

## 2023-01-31 NOTE — PROGRESS NOTES
Kajal Cody (:  1980) is a 38 y.o. female, here for evaluation of the following chief complaint(s):  Follow-up      SUBJECTIVE/OBJECTIVE:  HPI:     Velasquez Winslow is a very pleasant 38year old female that presents today for follow up on colonoscopy. Findings:      Normal colon. No internal hemorrhoids. Small external.       Patient stopped Linzess around September because the constipation improved  Noticing the constipation starting again  Will restart Linzess          ROS:  General: Patient denies n/v/f/c or weight loss. HEENT: Patient denies persistent postnasal drip, scleral icterus, drooling, persistent bleeding from nose/mouth. Resp: Patient denies SOB, wheezing, productive cough. Cards: Patient denies CP, palpitations, significant edema  GI: As above. Derm: Patient denies jaundice/rashes. Musc: Patient denies diffuse/irregular joint swelling or myalgias. Objective   Wt Readings from Last 3 Encounters:   23 122 lb (55.3 kg)   23 121 lb (54.9 kg)   22 132 lb (59.9 kg)     Temp Readings from Last 3 Encounters:   23 97 °F (36.1 °C) (Temporal)   23 97 °F (36.1 °C) (Temporal)   22 97.9 °F (36.6 °C)     BP Readings from Last 3 Encounters:   23 102/62   23 (!) 119/58   22 120/64     Pulse Readings from Last 3 Encounters:   23 58   23 62   22 81        Physical Exam  Constitutional:       Appearance: Normal appearance. Neurological:      Mental Status: She is alert.        Past Medical History:   Diagnosis Date    Hx of non anemic vitamin B12 deficiency 10/6/2020    Neuropathy 10/6/2020    Right lumbosacral radiculopathy 10/9/2020      Past Surgical History:   Procedure Laterality Date    ANKLE SURGERY Right 2021    REPAIR RIGHT EXTENSOR DIGITORUM LONGUS TENDON WITH GRAFT (TENDON GRAFT-ARTHREX) (CPT 81185) performed by Cassidy Belle DPM at [facility] 2023    COLONOSCOPY performed by Radha Lee MD at [address] Right 01/21/2021    REPAIR EXTENSOR [facility] RIGHT    TURBINATE RESECTION  08/17/2017    WISDOM TOOTH EXTRACTION        Family History   Problem Relation Age of Onset    Other Mother         ALS     Prostate Cancer Father         Lab Results   Component Value Date    WBC 5.1 01/24/2022    HGB 13.7 01/24/2022    HCT 42.6 01/24/2022    MCV 94.2 01/24/2022     01/24/2022      Lab Results   Component Value Date     01/24/2022    K 4.2 01/24/2022     01/24/2022    CO2 19 (L) 01/24/2022    BUN 14 01/24/2022    CREATININE 1.1 (H) 01/24/2022    GLUCOSE 93 01/24/2022    CALCIUM 9.8 01/24/2022    PROT 7.0 01/24/2022    LABALBU 4.3 01/24/2022    BILITOT 0.2 01/24/2022    ALKPHOS 37 01/24/2022    AST 28 01/24/2022    ALT 18 01/24/2022    LABGLOM 55 01/24/2022    GFRAA >60 01/24/2022                       ASSESSMENT/PLAN:    1. Constipation, unspecified constipation type    -colonoscopy reviewed today  -patient will continue Linzess as directed. She may add a stool softener or Miralax as needed  -In accordance with current ASGE guidelines. We will plan for a repeat colonoscopy for the purpose of colorectal cancer screening in  2028   . Return for Follow up in 5 years. An electronic signature was used to authenticate this note.     --CORTEZ Beckham - CNP

## 2023-02-01 LAB — TSH SERPL DL<=0.05 MIU/L-ACNC: 1.97 UIU/ML (ref 0.27–4.2)

## 2023-02-17 DIAGNOSIS — F51.01 PRIMARY INSOMNIA: ICD-10-CM

## 2023-02-17 RX ORDER — TRAZODONE HYDROCHLORIDE 50 MG/1
50 TABLET ORAL NIGHTLY
Qty: 90 TABLET | Refills: 1 | Status: SHIPPED | OUTPATIENT
Start: 2023-02-17

## 2023-04-27 ENCOUNTER — OFFICE VISIT (OUTPATIENT)
Dept: FAMILY MEDICINE CLINIC | Age: 43
End: 2023-04-27
Payer: COMMERCIAL

## 2023-04-27 VITALS
SYSTOLIC BLOOD PRESSURE: 114 MMHG | HEIGHT: 63 IN | OXYGEN SATURATION: 100 % | RESPIRATION RATE: 18 BRPM | WEIGHT: 117 LBS | TEMPERATURE: 98 F | DIASTOLIC BLOOD PRESSURE: 72 MMHG | BODY MASS INDEX: 20.73 KG/M2 | HEART RATE: 88 BPM

## 2023-04-27 DIAGNOSIS — J02.9 SORE THROAT: ICD-10-CM

## 2023-04-27 DIAGNOSIS — J01.00 ACUTE NON-RECURRENT MAXILLARY SINUSITIS: Primary | ICD-10-CM

## 2023-04-27 LAB — S PYO AG THROAT QL: NORMAL

## 2023-04-27 PROCEDURE — 87880 STREP A ASSAY W/OPTIC: CPT

## 2023-04-27 PROCEDURE — 99213 OFFICE O/P EST LOW 20 MIN: CPT

## 2023-04-27 RX ORDER — AMOXICILLIN AND CLAVULANATE POTASSIUM 875; 125 MG/1; MG/1
1 TABLET, FILM COATED ORAL 2 TIMES DAILY
Qty: 14 TABLET | Refills: 0 | Status: SHIPPED | OUTPATIENT
Start: 2023-04-27 | End: 2023-05-04

## 2023-04-27 RX ORDER — BENZONATATE 200 MG/1
200 CAPSULE ORAL 3 TIMES DAILY PRN
Qty: 30 CAPSULE | Refills: 0 | Status: SHIPPED | OUTPATIENT
Start: 2023-04-27 | End: 2023-05-04

## 2023-04-27 RX ORDER — POLYETHYLENE GLYCOL 3350 17 G/17G
POWDER, FOR SOLUTION ORAL
COMMUNITY
Start: 2023-04-10

## 2023-04-27 NOTE — PROGRESS NOTES
2023     [facility] Vivo 38 y.o. female    : 1980   Chief Complaint:   Pharyngitis (Onset x 2/3 days /Some discomfort with swallowing ), Headache, and Diarrhea      History of Present Illness   Source of history provided by:  patient. Agustin Tovar is a 38 y.o. old female who presents to [address] [place] [address] for evaluation of sinus pressure x 5 days. Associated symptoms include cough, congestion, and headache. Since onset symptoms have been consistent. Patient has had no known Covid 19 exposure. Patient has not been diagnosed with COVID-19 in the last 90 days. Has taken OTC medications at home with some symptomatic relief. Denies any fever, chills, CP, dyspnea, LE edema, abdominal pain, nausea, vomiting, rash, dizziness, or lethargy. Denies any history of asthma, pneumonia, recurrent bronchitis or COPD. They have no history of tobacco abuse. ROS   Past Medical History:   Past Medical History:   Diagnosis Date    Hx of non anemic vitamin B12 deficiency 10/6/2020    Neuropathy 10/6/2020    Right lumbosacral radiculopathy 10/9/2020     Past Surgical History:  has a past surgical history that includes [place] tooth extraction; turbinate resection (2017); Foot surgery (Right, 2021); Ankle surgery (Right, 2021); and Colonoscopy (N/A, 2023). Social History:  reports that she has never smoked. She has never used smokeless tobacco. She reports current alcohol use of about 2.0 standard drinks per week. She reports that she does not use drugs. Family History: family history includes Other in her mother; Prostate Cancer in her father. Allergies: Patient has no known allergies.     Unless otherwise stated in this report the patient's positive and negative responses for review of systems for constitutional, eyes, ENT, cardiovascular, respiratory, gastrointestinal, neurological, , musculoskeletal, and integument systems and related systems to the presenting problem are either stated in

## 2023-05-30 RX ORDER — POLYETHYLENE GLYCOL 3350 17 G/17G
17 POWDER, FOR SOLUTION ORAL DAILY
Qty: 1 EACH | Refills: 4 | Status: SHIPPED | OUTPATIENT
Start: 2023-05-30

## 2023-08-11 ENCOUNTER — OFFICE VISIT (OUTPATIENT)
Dept: PODIATRY | Age: 43
End: 2023-08-11
Payer: COMMERCIAL

## 2023-08-11 VITALS — HEIGHT: 63 IN | WEIGHT: 115 LBS | BODY MASS INDEX: 20.38 KG/M2

## 2023-08-11 DIAGNOSIS — M25.572 PAIN IN LEFT ANKLE AND JOINTS OF LEFT FOOT: ICD-10-CM

## 2023-08-11 DIAGNOSIS — M77.52 TENDINITIS OF LEFT FOOT: Primary | ICD-10-CM

## 2023-08-11 DIAGNOSIS — R26.2 DIFFICULTY WALKING: ICD-10-CM

## 2023-08-11 PROCEDURE — 99213 OFFICE O/P EST LOW 20 MIN: CPT | Performed by: PODIATRIST

## 2023-08-11 NOTE — PROGRESS NOTES
Patient is in today for evaluation of left foot. Patient says she is having some discomfort in the back of her achilles area and around the front of the ankle. Patient said it comes and goes. Patient also needs a new script for orthotics.  Pcp is Cristian Huston DO  Last ov 11/29/22

## 2023-08-11 NOTE — PROGRESS NOTES
23     Celeste Briceño Vivo    : 1980   Sex: female    Age: 38 y.o. Patient's PCP/Provider is:  Araceli Amaya DO    Subjective:  Patient is seen today for evaluation regarding issues left lower extremity. Patient states she is in need of new custom foot orthoses. She denies any recent injuries or changes in activities. She is still an avid runner. No other additional abnormalities noted at this time. Chief Complaint   Patient presents with    Foot Pain     LEFT FOOT        ROS:  Const: Positives and pertinent negatives as per HPI. Musculo: Denies symptoms other than stated above. Neuro: Denies symptoms other than stated above. Skin: Denies symptoms other than stated above. Current Medications:    Current Outpatient Medications:     polyethylene glycol (GLYCOLAX) 17 GM/SCOOP powder, Take 17 g by mouth daily, Disp: 1 each, Rfl: 4    linaclotide (LINZESS) 145 MCG capsule, Take 1 capsule by mouth every morning (before breakfast), Disp: 30 capsule, Rfl: 5    norethindrone-ethinyl estradiol (OVCON) 0.4-35 MG-MCG per tablet, Take by mouth See Admin Instructions, Disp: , Rfl:     traZODone (DESYREL) 50 MG tablet, Take 1 tablet by mouth nightly (Patient not taking: Reported on 2023), Disp: 90 tablet, Rfl: 1    senna (SENOKOT) 8.6 MG tablet, Take 2 tablets by mouth every evening (Patient not taking: Reported on 2023), Disp: 60 tablet, Rfl: 11    Allergies:  No Known Allergies    Vitals:    23 0714   Weight: 115 lb (52.2 kg)   Height: 5' 3\" (1.6 m)       Exam:  Neurovascular status unchanged. Mild tenderness noted into the anterior extensor region with range of motion and muscle testing performed. No edema or ecchymotic skin changes present left lower extremity. Right lower extremity stable at this time. Mild antalgic gait noted left lower extremity. Diagnostic Studies:     No results found.       Procedures:    None    Plan Per Assessment  Eda Iqbal was seen today for foot

## 2023-08-29 ENCOUNTER — TELEPHONE (OUTPATIENT)
Dept: FAMILY MEDICINE CLINIC | Age: 43
End: 2023-08-29

## 2023-08-29 NOTE — TELEPHONE ENCOUNTER
Called and Lvm. Patient  Is not an established patient with Dr Maricarmen Wheeler so the 8.31.23 appointment is inappropriate and will be cancelled. She needs to schedule with Dr Sha Anderson or establish care with another provider. Dr Maricarmen Wheeler is not accepting new patients at this time.

## 2023-08-30 ENCOUNTER — TELEPHONE (OUTPATIENT)
Dept: FAMILY MEDICINE CLINIC | Age: 43
End: 2023-08-30

## 2023-11-15 ENCOUNTER — OFFICE VISIT (OUTPATIENT)
Dept: FAMILY MEDICINE CLINIC | Age: 43
End: 2023-11-15
Payer: COMMERCIAL

## 2023-11-15 VITALS
BODY MASS INDEX: 20.73 KG/M2 | HEIGHT: 63 IN | DIASTOLIC BLOOD PRESSURE: 70 MMHG | WEIGHT: 117 LBS | HEART RATE: 75 BPM | SYSTOLIC BLOOD PRESSURE: 112 MMHG | TEMPERATURE: 98.6 F | OXYGEN SATURATION: 100 %

## 2023-11-15 DIAGNOSIS — J02.9 SORE THROAT: Primary | ICD-10-CM

## 2023-11-15 DIAGNOSIS — J01.90 ACUTE BACTERIAL SINUSITIS: ICD-10-CM

## 2023-11-15 DIAGNOSIS — B96.89 ACUTE BACTERIAL SINUSITIS: ICD-10-CM

## 2023-11-15 LAB
INFLUENZA A ANTIBODY: NEGATIVE
INFLUENZA B ANTIBODY: NEGATIVE
S PYO AG THROAT QL: NORMAL

## 2023-11-15 PROCEDURE — 96372 THER/PROPH/DIAG INJ SC/IM: CPT | Performed by: FAMILY MEDICINE

## 2023-11-15 PROCEDURE — 87804 INFLUENZA ASSAY W/OPTIC: CPT | Performed by: FAMILY MEDICINE

## 2023-11-15 PROCEDURE — 99213 OFFICE O/P EST LOW 20 MIN: CPT | Performed by: FAMILY MEDICINE

## 2023-11-15 PROCEDURE — 87880 STREP A ASSAY W/OPTIC: CPT | Performed by: FAMILY MEDICINE

## 2023-11-15 RX ORDER — AMOXICILLIN AND CLAVULANATE POTASSIUM 875; 125 MG/1; MG/1
1 TABLET, FILM COATED ORAL 2 TIMES DAILY
Qty: 14 TABLET | Refills: 0 | Status: SHIPPED | OUTPATIENT
Start: 2023-11-15 | End: 2023-11-22

## 2023-11-15 RX ORDER — GUAIFENESIN 600 MG/1
600 TABLET, EXTENDED RELEASE ORAL 2 TIMES DAILY
Qty: 30 TABLET | Refills: 0 | Status: SHIPPED | OUTPATIENT
Start: 2023-11-15 | End: 2023-11-30

## 2023-11-15 RX ORDER — METHYLPREDNISOLONE ACETATE 40 MG/ML
40 INJECTION, SUSPENSION INTRA-ARTICULAR; INTRALESIONAL; INTRAMUSCULAR; SOFT TISSUE ONCE
Status: COMPLETED | OUTPATIENT
Start: 2023-11-15 | End: 2023-11-15

## 2023-11-15 RX ADMIN — METHYLPREDNISOLONE ACETATE 40 MG: 40 INJECTION, SUSPENSION INTRA-ARTICULAR; INTRALESIONAL; INTRAMUSCULAR; SOFT TISSUE at 14:55

## 2024-01-31 RX ORDER — SENNOSIDES 8.6 MG
2 TABLET ORAL EVERY EVENING
Qty: 60 TABLET | Refills: 3 | Status: SHIPPED | OUTPATIENT
Start: 2024-01-31

## 2024-03-08 ENCOUNTER — NURSE ONLY (OUTPATIENT)
Dept: PRIMARY CARE CLINIC | Age: 44
End: 2024-03-08
Payer: COMMERCIAL

## 2024-03-08 DIAGNOSIS — E53.8 B12 DEFICIENCY: Primary | ICD-10-CM

## 2024-03-08 PROCEDURE — 96372 THER/PROPH/DIAG INJ SC/IM: CPT | Performed by: FAMILY MEDICINE

## 2024-03-08 RX ORDER — CYANOCOBALAMIN 1000 UG/ML
1000 INJECTION, SOLUTION INTRAMUSCULAR; SUBCUTANEOUS ONCE
Status: COMPLETED | OUTPATIENT
Start: 2024-03-08 | End: 2024-03-08

## 2024-03-08 RX ADMIN — CYANOCOBALAMIN 1000 MCG: 1000 INJECTION, SOLUTION INTRAMUSCULAR; SUBCUTANEOUS at 13:34

## 2024-04-24 ENCOUNTER — OFFICE VISIT (OUTPATIENT)
Dept: FAMILY MEDICINE CLINIC | Age: 44
End: 2024-04-24
Payer: COMMERCIAL

## 2024-04-24 VITALS
SYSTOLIC BLOOD PRESSURE: 104 MMHG | OXYGEN SATURATION: 98 % | BODY MASS INDEX: 21.26 KG/M2 | TEMPERATURE: 98.4 F | DIASTOLIC BLOOD PRESSURE: 60 MMHG | HEIGHT: 63 IN | WEIGHT: 120 LBS | HEART RATE: 84 BPM

## 2024-04-24 DIAGNOSIS — J30.2 SEASONAL ALLERGIC RHINITIS, UNSPECIFIED TRIGGER: ICD-10-CM

## 2024-04-24 DIAGNOSIS — Z00.00 ENCOUNTER FOR WELL ADULT EXAM WITHOUT ABNORMAL FINDINGS: Primary | ICD-10-CM

## 2024-04-24 DIAGNOSIS — R09.81 SINUS CONGESTION: ICD-10-CM

## 2024-04-24 PROCEDURE — 99396 PREV VISIT EST AGE 40-64: CPT | Performed by: FAMILY MEDICINE

## 2024-04-24 RX ORDER — CETIRIZINE HYDROCHLORIDE, PSEUDOEPHEDRINE HYDROCHLORIDE 5; 120 MG/1; MG/1
1 TABLET, FILM COATED, EXTENDED RELEASE ORAL 2 TIMES DAILY
Qty: 30 TABLET | Refills: 0 | Status: SHIPPED | OUTPATIENT
Start: 2024-04-24

## 2024-04-24 RX ORDER — AMOXICILLIN AND CLAVULANATE POTASSIUM 875; 125 MG/1; MG/1
1 TABLET, FILM COATED ORAL 2 TIMES DAILY
Qty: 14 TABLET | Refills: 0 | Status: SHIPPED | OUTPATIENT
Start: 2024-04-24 | End: 2024-05-01

## 2024-04-24 RX ORDER — PREDNISONE 20 MG/1
20 TABLET ORAL 2 TIMES DAILY
Qty: 10 TABLET | Refills: 0 | Status: SHIPPED | OUTPATIENT
Start: 2024-04-24 | End: 2024-04-29

## 2024-04-24 SDOH — HEALTH STABILITY: PHYSICAL HEALTH: ON AVERAGE, HOW MANY DAYS PER WEEK DO YOU ENGAGE IN MODERATE TO STRENUOUS EXERCISE (LIKE A BRISK WALK)?: 5 DAYS

## 2024-04-24 ASSESSMENT — PATIENT HEALTH QUESTIONNAIRE - PHQ9
SUM OF ALL RESPONSES TO PHQ QUESTIONS 1-9: 0
SUM OF ALL RESPONSES TO PHQ9 QUESTIONS 1 & 2: 0
SUM OF ALL RESPONSES TO PHQ QUESTIONS 1-9: 0
1. LITTLE INTEREST OR PLEASURE IN DOING THINGS: NOT AT ALL
2. FEELING DOWN, DEPRESSED OR HOPELESS: NOT AT ALL

## 2024-04-24 ASSESSMENT — ENCOUNTER SYMPTOMS
SHORTNESS OF BREATH: 0
DIARRHEA: 0
VOMITING: 0
ABDOMINAL PAIN: 0
CONSTIPATION: 0
SORE THROAT: 1
WHEEZING: 0
EYE PAIN: 0
BACK PAIN: 0
CHEST TIGHTNESS: 0
SINUS PRESSURE: 1
NAUSEA: 0
SINUS PAIN: 0
COUGH: 1
RHINORRHEA: 1
TROUBLE SWALLOWING: 0

## 2024-04-24 NOTE — PROGRESS NOTES
Slim Morales MD at [facility] ENDOSCOPY    FOOT SURGERY Right 01/21/2021    REPAIR EXTENSOR DIGITORUMLONGUE WITH GRANFT RIGHT    TURBINATE RESECTION  08/17/2017    WISDOM TOOTH EXTRACTION        Social History       Tobacco History       Smoking Status  Never      Smokeless Tobacco Use  Never              Alcohol History       Alcohol Use Status  Yes Drinks/Week  0 Glasses of wine, 4 Cans of beer, 0 Shots of liquor, 0 Drinks containing 0.5 oz of alcohol per week Amount  4.0 standard drinks of alcohol/wk Comment  2-3 beers 2-3 days a week               Drug Use       Drug Use Status  Never              Sexual Activity       Sexually Active  Yes Partners  Male                /60   Pulse 84   Temp 98.4 °F (36.9 °C)   Ht 1.6 m (5' 3\")   Wt 54.4 kg (120 lb)   LMP 04/24/2024   SpO2 98%   BMI 21.26 kg/m²     EXAM:   Physical Exam  Vitals and nursing note reviewed.   Constitutional:       General: She is not in acute distress.     Appearance: She is well-developed. She is ill-appearing. She is not toxic-appearing.   HENT:      Head: Normocephalic and atraumatic.      Ears:      Comments: Tm full but normal color     Nose: Congestion and rhinorrhea present.      Mouth/Throat:      Mouth: Mucous membranes are moist.      Pharynx: No oropharyngeal exudate or posterior oropharyngeal erythema.   Eyes:      Pupils: Pupils are equal, round, and reactive to light.   Cardiovascular:      Rate and Rhythm: Normal rate and regular rhythm.   Pulmonary:      Effort: Pulmonary effort is normal. No respiratory distress.      Breath sounds: Normal breath sounds. No wheezing or rhonchi.   Musculoskeletal:      Cervical back: Normal range of motion.   Skin:     General: Skin is warm and dry.   Neurological:      Mental Status: She is alert and oriented to person, place, and time.   Psychiatric:         Mood and Affect: Mood normal.         Thought Content: Thought content normal.          Renate was seen today for establish

## 2024-06-10 RX ORDER — SENNOSIDES 8.6 MG/1
2 TABLET, FILM COATED ORAL EVERY EVENING
Qty: 60 TABLET | Refills: 3 | Status: SHIPPED | OUTPATIENT
Start: 2024-06-10

## 2024-08-23 RX ORDER — SENNOSIDES A AND B 8.6 MG/1
2 TABLET, FILM COATED ORAL EVERY EVENING
Qty: 60 TABLET | Refills: 3 | Status: SHIPPED | OUTPATIENT
Start: 2024-08-23

## 2024-10-14 ENCOUNTER — TELEPHONE (OUTPATIENT)
Dept: PRIMARY CARE CLINIC | Age: 44
End: 2024-10-14

## 2024-12-10 RX ORDER — SODIUM, POTASSIUM,MAG SULFATES 17.5-3.13G
1 SOLUTION, RECONSTITUTED, ORAL ORAL ONCE
Qty: 1 EACH | Refills: 0 | Status: SHIPPED | OUTPATIENT
Start: 2024-12-10 | End: 2024-12-10

## 2025-01-06 RX ORDER — SENNOSIDES A AND B 8.6 MG/1
2 TABLET, FILM COATED ORAL EVERY EVENING
Qty: 60 TABLET | Refills: 3 | Status: SHIPPED | OUTPATIENT
Start: 2025-01-06

## 2025-05-19 RX ORDER — SENNOSIDES A AND B 8.6 MG/1
2 TABLET, FILM COATED ORAL EVERY EVENING
Qty: 60 TABLET | Refills: 3 | Status: SHIPPED | OUTPATIENT
Start: 2025-05-19

## 2025-05-28 RX ORDER — SENNOSIDES 8.6 MG/1
2 TABLET ORAL EVERY EVENING
Qty: 60 TABLET | Refills: 3 | Status: SHIPPED | OUTPATIENT
Start: 2025-05-28

## (undated) DEVICE — DRAPE,EXTREMITY,89X128,STERILE: Brand: MEDLINE

## (undated) DEVICE — DRESSING PETRO W3XL8IN OIL EMUL N ADH GZ KNIT IMPREG CELOS

## (undated) DEVICE — GAUZE,SPONGE,4"X4",8PLY,STRL,LF,10/TRAY: Brand: MEDLINE

## (undated) DEVICE — BANDAGE,GAUZE,4.5"X4.1YD,STERILE,LF: Brand: MEDLINE

## (undated) DEVICE — PACK SURG BASIC I LF

## (undated) DEVICE — GLOVE SURG SZ 85 L12IN FNGR THK94MIL STD WHT LTX FREE

## (undated) DEVICE — CLOTH SURG PREP PREOPERATIVE CHLORHEXIDINE GLUC 2% READYPREP

## (undated) DEVICE — NON COATED ELECTROSURGICAL NEEDLE ELECTRODE, 2.75 INCH (7 CM): Brand: MEGADYNE

## (undated) DEVICE — STRIP,CLOSURE,WOUND,MEDI-STRIP,1/2X4: Brand: MEDLINE

## (undated) DEVICE — INTENDED FOR TISSUE SEPARATION, AND OTHER PROCEDURES THAT REQUIRE A SHARP SURGICAL BLADE TO PUNCTURE OR CUT.: Brand: BARD-PARKER ® STAINLESS STEEL BLADES

## (undated) DEVICE — CONTAINER SPEC COLL 960ML POLYPR TRIANG GRAD INTAKE/OUTPUT

## (undated) DEVICE — COVER,LIGHT HANDLE,FLX,1/PK: Brand: MEDLINE INDUSTRIES, INC.

## (undated) DEVICE — Device: Brand: DEFENDO VALVE AND CONNECTOR KIT

## (undated) DEVICE — MASTISOL ADHESIVE LIQ 2/3ML

## (undated) DEVICE — TUBING, SUCTION, 1/4" X 10', STRAIGHT: Brand: MEDLINE

## (undated) DEVICE — ELECTRODE PT RET AD L9FT HI MOIST COND ADH HYDRGEL CORDED

## (undated) DEVICE — GAUZE,SPONGE,4"X4",16PLY,XRAY,STRL,LF: Brand: MEDLINE

## (undated) DEVICE — MARKER,SKIN,WI/RULER AND LABELS: Brand: MEDLINE

## (undated) DEVICE — LUBRICANT SURG JELLY ST BACTER TUBE 4.25OZ

## (undated) DEVICE — KENDALL 450 SERIES MONITORING FOAM ELECTRODE - RECTANGULAR SHAPE ( 3/PK): Brand: KENDALL

## (undated) DEVICE — GOWN ISOLATN REG YEL M WT MULTIPLY SIDETIE LEV 2

## (undated) DEVICE — CHLORAPREP 26ML ORANGE

## (undated) DEVICE — KIT BEDSIDE REVITAL OX 500ML

## (undated) DEVICE — TUBING, SUCTION, 3/16" X 10', STRAIGHT: Brand: MEDLINE

## (undated) DEVICE — ADAPTER CLEANING PORPOISE CLEANING

## (undated) DEVICE — CLOTH SKIN PREP 2% CHG

## (undated) DEVICE — SUTURE ETHLN SZ 4-0 L18IN NONABSORBABLE BLK L13MM P-3 3/8 699G

## (undated) DEVICE — 6 X 9  1.75MIL 4-WALL LABGUARD: Brand: MINIGRIP COMMERCIAL LLC

## (undated) DEVICE — BANDAGE,GAUZE,CONFORMING,3"X75",STRL,LF: Brand: MEDLINE

## (undated) DEVICE — GLOVE,SURG,SENSICARE,ALOE,LF,PF,7: Brand: MEDLINE

## (undated) DEVICE — TOWEL,OR,DSP,ST,BLUE,STD,6/PK,12PK/CS: Brand: MEDLINE

## (undated) DEVICE — FORCEPS BX L240CM JAW DIA2.8MM L CAP W/ NDL MIC MESH TOOTH

## (undated) DEVICE — 4-PORT MANIFOLD: Brand: NEPTUNE 2

## (undated) DEVICE — MASK,FACE,MAXFLUIDPROTECT,SHIELD/ERLPS: Brand: MEDLINE

## (undated) DEVICE — DOUBLE BASIN SET: Brand: MEDLINE INDUSTRIES, INC.

## (undated) DEVICE — COUNTER NDL 10 COUNT HLD 20 FOAM BLK SGL MAG

## (undated) DEVICE — SUTURE VCRL SZ 3-0 L27IN ABSRB UD L26MM SH 1/2 CIR J416H

## (undated) DEVICE — GOWN,SIRUS,FABRNF,L,20/CS: Brand: MEDLINE

## (undated) DEVICE — SPONGE GZ 4IN 4IN 4 PLY N WVN AVANT

## (undated) DEVICE — SUTURE COAT VCRL SZ 4-0 L18IN ABSRB UD L19MM PS-2 1/2 CIR J496G